# Patient Record
Sex: MALE | Race: WHITE | Employment: OTHER | URBAN - METROPOLITAN AREA
[De-identification: names, ages, dates, MRNs, and addresses within clinical notes are randomized per-mention and may not be internally consistent; named-entity substitution may affect disease eponyms.]

---

## 2017-02-02 ENCOUNTER — TELEPHONE (OUTPATIENT)
Dept: ENDOCRINOLOGY | Age: 62
End: 2017-02-02

## 2017-02-02 NOTE — TELEPHONE ENCOUNTER
Patient says Baljit Monday may require a prior authorization. Patient says when he went to  prescription pharmacist says insurance company needs our office to verify patient requires this prescription.

## 2017-02-07 NOTE — TELEPHONE ENCOUNTER
Patient would like an update on prior authorization. Patient says he is completely out of medication.

## 2017-02-07 NOTE — TELEPHONE ENCOUNTER
Informed pt that PA was received by insurance, but they requested allowing 5 business days to respond

## 2017-02-09 ENCOUNTER — TELEPHONE (OUTPATIENT)
Dept: ENDOCRINOLOGY | Age: 62
End: 2017-02-09

## 2017-03-02 ENCOUNTER — TELEPHONE (OUTPATIENT)
Dept: ENDOCRINOLOGY | Age: 62
End: 2017-03-02

## 2017-03-02 RX ORDER — SITAGLIPTIN AND METFORMIN HYDROCHLORIDE 50; 1000 MG/1; MG/1
TABLET, FILM COATED, EXTENDED RELEASE ORAL
Qty: 30 TAB | Refills: 6 | Status: SHIPPED | OUTPATIENT
Start: 2017-03-02 | End: 2017-03-30 | Stop reason: DRUGHIGH

## 2017-03-02 RX ORDER — PRAVASTATIN SODIUM 20 MG/1
TABLET ORAL
Qty: 90 TAB | Refills: 6 | Status: SHIPPED | OUTPATIENT
Start: 2017-03-02 | End: 2018-05-23 | Stop reason: SDUPTHER

## 2017-03-09 NOTE — TELEPHONE ENCOUNTER
Left message advising pt to contact his PCP about lyme disease testing.      Also he can stop by the office for new janumet savings card

## 2017-03-09 NOTE — TELEPHONE ENCOUNTER
----- Message from Maria Guadalupe Bangura sent at 3/8/2017  5:41 PM EST -----  Regarding: Dr Toussaint/telephone  Pt's (p) [phone], pt said though out the year he has had 4 imbed  tick bites the latest one found was last night, he  Brandon Cali he has an upcoming  Lab appt on 3/20/17 and would like to know if Dr Kip Khoury can add  A Lyme dz test to his order along with the regular diabetes check. .    Also his discount card for his Janumet medication has    He said the card is still good but he has used up all the discounts on the card and will need to have a new one issued to him, he would like to know if Dr Kip Khoury can issue a new card  So he can continue with the discounts for the medication

## 2017-03-13 ENCOUNTER — TELEPHONE (OUTPATIENT)
Dept: ENDOCRINOLOGY | Age: 62
End: 2017-03-13

## 2017-03-30 ENCOUNTER — OFFICE VISIT (OUTPATIENT)
Dept: ENDOCRINOLOGY | Age: 62
End: 2017-03-30

## 2017-03-30 VITALS
HEART RATE: 63 BPM | DIASTOLIC BLOOD PRESSURE: 58 MMHG | BODY MASS INDEX: 26.04 KG/M2 | SYSTOLIC BLOOD PRESSURE: 103 MMHG | HEIGHT: 71 IN | OXYGEN SATURATION: 99 % | TEMPERATURE: 96 F | WEIGHT: 186 LBS | RESPIRATION RATE: 20 BRPM

## 2017-03-30 DIAGNOSIS — I10 ESSENTIAL HYPERTENSION: ICD-10-CM

## 2017-03-30 DIAGNOSIS — E78.2 MIXED HYPERLIPIDEMIA: ICD-10-CM

## 2017-03-30 DIAGNOSIS — E11.65 UNCONTROLLED TYPE 2 DIABETES MELLITUS WITH HYPERGLYCEMIA, WITHOUT LONG-TERM CURRENT USE OF INSULIN (HCC): Primary | ICD-10-CM

## 2017-03-30 RX ORDER — LANCETS
EACH MISCELLANEOUS
Qty: 100 EACH | Refills: 11 | Status: SHIPPED | OUTPATIENT
Start: 2017-03-30

## 2017-03-30 NOTE — PROGRESS NOTES
Wt Readings from Last 3 Encounters:   03/30/17 186 lb (84.4 kg)   09/22/16 185 lb (83.9 kg)   03/22/16 185 lb (83.9 kg)     Temp Readings from Last 3 Encounters:   03/30/17 96 °F (35.6 °C) (Oral)   09/22/16 97.3 °F (36.3 °C) (Oral)   03/22/16 96.9 °F (36.1 °C) (Oral)     BP Readings from Last 3 Encounters:   03/30/17 103/58   09/22/16 107/63   03/22/16 119/72     Pulse Readings from Last 3 Encounters:   03/30/17 63   09/22/16 64   03/22/16 63     Lab Results   Component Value Date/Time    Hemoglobin A1c 6.0 03/20/2017 08:23 AM    Hemoglobin A1c (POC) 6.1 08/13/2013 09:17 AM     Last Podiatry ov 2016  Last Eye exam Nov 2016

## 2017-03-30 NOTE — MR AVS SNAPSHOT
Visit Information Date & Time Provider Department Dept. Phone Encounter #  
 3/30/2017  9:15 AM Gayathri Nuñez MD [facility] [phone] [id number] Follow-up Instructions Return in about 6 months (around 9/30/2017). Upcoming Health Maintenance Date Due Hepatitis C Screening 1955 FOOT EXAM Q1 11/29/1965 Pneumococcal 19-64 Medium Risk (1 of 1 - PPSV23) 11/29/1974 DTaP/Tdap/Td series (1 - Tdap) 11/29/1976 FOBT Q 1 YEAR AGE 50-75 11/29/2005 ZOSTER VACCINE AGE 60> 11/29/2015 INFLUENZA AGE 9 TO ADULT 8/1/2016 EYE EXAM RETINAL OR DILATED Q1 12/9/2016 LIPID PANEL Q1 9/15/2017 HEMOGLOBIN A1C Q6M 9/20/2017 MICROALBUMIN Q1 3/20/2018 Allergies as of 3/30/2017  Review Complete On: 3/30/2017 By: Gayathri Nuñez MD  
  
 Severity Noted Reaction Type Reactions Lisinopril  01/13/2011    Swelling Current Immunizations  Never Reviewed No immunizations on file. Not reviewed this visit You Were Diagnosed With   
  
 Codes Comments Uncontrolled type 2 diabetes mellitus with hyperglycemia, without long-term current use of insulin ([facility]    -  Primary ICD-10-CM: E11.65 ICD-9-CM: 250.02 Essential hypertension     ICD-10-CM: I10 
ICD-9-CM: 401.9 Mixed hyperlipidemia     ICD-10-CM: E78.2 ICD-9-CM: 272.2 Vitals BP Pulse Temp Resp Height(growth percentile) Weight(growth percentile) 103/58 63 96 °F (35.6 °C) (Oral) 20 5' 11\" (1.803 m) 186 lb (84.4 kg) SpO2 BMI Smoking Status 99% 25.94 kg/m2 Former Smoker BMI and BSA Data Body Mass Index Body Surface Area  
 25.94 kg/m 2 2.06 m 2 Preferred Pharmacy Pharmacy Name Phone 99 [facility], [address] [place] [address] [phone] Your Updated Medication List  
  
   
This list is accurate as of: 3/30/17  9:44 AM.  Always use your most recent med list.  
  
  
  
 ALPRAZolam 1 mg tablet Commonly known as:  Pixie Oz Take 1 mg by mouth nightly as needed. amLODIPine 10 mg tablet Commonly known as:  [place] Dre Take  by mouth daily. canagliflozin 300 mg tablet Commonly known as:  Darryl Echavarria TAKE 1 TABLET BY MOUTH EVERY DAY before BREAKFAST  
  
 EPIPEN 2-ARIS 0.3 mg/0.3 mL injection Generic drug:  EPINEPHrine  
  
 glucose blood VI test strips strip Commonly known as:  CONTOUR NEXT STRIPS Test 2 times daily. Dx code E11.65 Lancets Misc Commonly known as:  [place] Test 2 times daily. Dx code 250.00  
  
 losartan 50 mg tablet Commonly known as:  COZAAR Take  by mouth daily. pravastatin 20 mg tablet Commonly known as:  PRAVACHOL  
TAKE 1 TABLET BY MOUTH AT BEDTIME SITagliptin-metFORMIN 100-1,000 mg Tm24 Commonly known as:  JANUMET XR Once a day stop janumet 50/1000 dose Prescriptions Sent to Pharmacy Refills SITagliptin-metFORMIN (JANUMET XR) 100-1,000 mg TM24 6 Sig: Once a day stop janumet 50/1000 dose Class: Normal  
 Pharmacy: [address] Ph #: [phone]  
 canagliflozin (INVOKANA) 300 mg tablet 6 Sig: TAKE 1 TABLET BY MOUTH EVERY DAY before BREAKFAST Class: Normal  
 Pharmacy: [address] Ph #: [phone] Lancets (MICROLET LANCET) misc 11 Sig: Test 2 times daily. Dx code 250.00 Class: Normal  
 Pharmacy: [address] [facility] Ph #: [phone] Follow-up Instructions Return in about 6 months (around 9/30/2017). Patient Instructions   
 
 
 janumet xr  50/1000 mg to once a day with meal 
 
 
invokana 300 mg before b-fast ( drink plenty of water ) Introducing [facility] & HEALTH SERVICES! Roberto Victor introduces happin! patient portal. Now you can access parts of your medical record, email your doctor's office, and request medication refills online. 1. In your internet browser, go to [email or web address] 2. Click on the First Time User? Click Here link in the Sign In box. You will see the New Member Sign Up page. 3. Enter your happin! Access Code exactly as it appears below. You will not need to use this code after youve completed the sign-up process. If you do not sign up before the expiration date, you must request a new code. · happin! Access Code: [email or web address] Expires: 6/28/2017  9:44 AM 
 
4. Enter the last four digits of your Social Security Number (xxxx) and Date of Birth (mm/dd/yyyy) as indicated and click Submit. You will be taken to the next sign-up page. 5. Create a happin! ID. This will be your happin! login ID and cannot be changed, so think of one that is secure and easy to remember. 6. Create a happin! password. You can change your password at any time. 7. Enter your Password Reset Question and Answer. This can be used at a later time if you forget your password. 8. Enter your e-mail address. You will receive e-mail notification when new information is available in [address] 9. Click Sign Up. You can now view and download portions of your medical record. 10. Click the Download Summary menu link to download a portable copy of your medical information. If you have questions, please visit the Frequently Asked Questions section of the happin! website. Remember, happin! is NOT to be used for urgent needs. For medical emergencies, dial 911. Now available from your iPhone and Android! Please provide this summary of care documentation to your next provider. Your primary care clinician is listed as RUDY Gold. If you have any questions after today's visit, please call [phone].

## 2017-03-30 NOTE — PATIENT INSTRUCTIONS
janumet xr  50/1000 mg to once a day with meal      invokana 300 mg before b-fast ( drink plenty of water )

## 2017-03-30 NOTE — PROGRESS NOTES
HISTORY OF PRESENT ILLNESS   Vinny Tillman is a 64 y.o. male. HPI   F/u for DM 2 after last visit in Sept 2016   Gained 1 lb of weight     He had some issues with PA on invokana  And   Janumet xr   Compliant with meds, otherwise     C/o price on janumet xr   Happy man   Agrees to better compliance         Review of Systems   Constitutional: Negative. HENT: Negative. Eyes: Negative for pain and redness. Respiratory: Negative. Cardiovascular: Negative for chest pain, palpitations and leg swelling. Gastrointestinal: Negative. Negative for constipation. Genitourinary: Negative. Musculoskeletal: Negative for myalgias. Skin: Negative. Neurological: Negative. Endo/Heme/Allergies: Negative. Psychiatric/Behavioral: Negative for depression and memory loss. The patient does not have insomnia. Physical Exam   Constitutional: He is oriented to person, place, and time. He appears well-developed and well-nourished. HENT:   Head: Normocephalic. Eyes: Conjunctivae and extraocular motions are normal. Pupils are equal, round, and reactive to light. Neck: Normal range of motion. Neck supple. No JVD present. No tracheal deviation present. No thyromegaly present. Cardiovascular: Normal rate, regular rhythm and normal heart sounds. Pulmonary/Chest: Effort normal and breath sounds normal.   Abdominal: Soft. Bowel sounds are normal.   Musculoskeletal: Normal range of motion. Lymphadenopathy:   He has no cervical adenopathy. Neurological: He is alert and oriented to person, place, and time. He has normal reflexes. Skin: Skin is warm. Psychiatric: He has a normal mood and affect.      Diabetic feet exam  Dr. Salguero Mask           Lab Results   Component Value Date/Time    Hemoglobin A1c 6.0 03/20/2017 08:23 AM    Hemoglobin A1c 5.9 09/15/2016 08:57 AM    Hemoglobin A1c 5.8 03/17/2016 09:14 AM    Microalb/Creat ratio (ug/mg creat.) 27.6 03/20/2017 08:23 AM    LDL, calculated 92 09/15/2016 08:57 AM Creatinine 0.91 09/15/2016 08:57 AM      Lab Results   Component Value Date/Time    Cholesterol, total 161 09/15/2016 08:57 AM    HDL Cholesterol 52 09/15/2016 08:57 AM    LDL, calculated 92 09/15/2016 08:57 AM    Triglyceride 87 09/15/2016 08:57 AM     Lab Results   Component Value Date/Time    AST (SGOT) 13 03/20/2017 08:23 AM    Alk. phosphatase 51 09/15/2016 08:57 AM     Lab Results   Component Value Date/Time    GFR est  09/15/2016 08:57 AM    GFR est non-AA 91 09/15/2016 08:57 AM    Creatinine 0.91 09/15/2016 08:57 AM    BUN 15 09/15/2016 08:57 AM    Sodium 141 09/15/2016 08:57 AM    Potassium 4.2 09/15/2016 08:57 AM    Chloride 101 09/15/2016 08:57 AM    CO2 25 09/15/2016 08:57 AM            ASSESSMENT and PLAN     1. DM 2 uncontrolled- A1c is   6 %      From march 2017   compared to 5.9 %   From sept 2016  Compared  to    5.8 %    From march 2016  Compared to    5.6 %     From sept 2015 compared to  5.7 %   From March 2015 compared to  5.8 %   From sept 2014 compared to  6.4 % from feb 2014 compared to 6.1 % from aug 2013 compared to  6.6 %  from feb 2013 compared to  6.5 % feb 2013  Compared to 7.3 % from aug 2012; A1c was 6.7 % in sept 2011 and it was 7.9 % from march 2012    He has Not been experiencing low blood sugars and he eats consistently   He  changed his  behaviour in good way   Maintained glycemic control     Reviewed log from book - no lows   and blood sugars are good   TLC emphasized     Continue on janumet xr 50/1 gm bid ,  From kombiglyza to 2.5/1000 bid by formulary   He is taking it only once a day, he would like to try that way     continue  Invokana 300 mg a day   Reassured him that he will never have low sugars on current regimen        2. HPL : lipids at goal-TG is better,continue on pravachol     3. HTN: well controlled, on cozaar and norvasc , no microalb noted     4. Neuropathy - he stopped   gabapentin  Off vitacort     5.  Elevated liver enzymes- resolved   ASA 81 mg daily F/u in 6 months

## 2017-09-21 ENCOUNTER — OFFICE VISIT (OUTPATIENT)
Dept: ENDOCRINOLOGY | Age: 62
End: 2017-09-21

## 2017-09-21 VITALS
RESPIRATION RATE: 14 BRPM | HEART RATE: 66 BPM | BODY MASS INDEX: 25.76 KG/M2 | HEIGHT: 71 IN | WEIGHT: 184 LBS | DIASTOLIC BLOOD PRESSURE: 54 MMHG | TEMPERATURE: 98.2 F | SYSTOLIC BLOOD PRESSURE: 100 MMHG

## 2017-09-21 DIAGNOSIS — E78.2 MIXED HYPERLIPIDEMIA: ICD-10-CM

## 2017-09-21 DIAGNOSIS — E11.65 UNCONTROLLED TYPE 2 DIABETES MELLITUS WITH HYPERGLYCEMIA, WITHOUT LONG-TERM CURRENT USE OF INSULIN (HCC): Primary | ICD-10-CM

## 2017-09-21 DIAGNOSIS — I10 ESSENTIAL HYPERTENSION: ICD-10-CM

## 2017-09-21 NOTE — MR AVS SNAPSHOT
Visit Information Date & Time Provider Department Dept. Phone Encounter #  
 9/21/2017 11:30 AM Areli Thompson MD [facility] [phone] [id number] Follow-up Instructions Return in about 1 year (around 9/21/2018). Upcoming Health Maintenance Date Due Hepatitis C Screening 1955 FOOT EXAM Q1 11/29/1965 Pneumococcal 19-64 Medium Risk (1 of 1 - PPSV23) 11/29/1974 DTaP/Tdap/Td series (1 - Tdap) 11/29/1976 FOBT Q 1 YEAR AGE 50-75 11/29/2005 ZOSTER VACCINE AGE 60> 9/29/2015 EYE EXAM RETINAL OR DILATED Q1 12/9/2016 INFLUENZA AGE 9 TO ADULT 8/1/2017 HEMOGLOBIN A1C Q6M 3/14/2018 MICROALBUMIN Q1 9/14/2018 LIPID PANEL Q1 9/14/2018 Allergies as of 9/21/2017  Review Complete On: 9/21/2017 By: Areli Thompson MD  
  
 Severity Noted Reaction Type Reactions Lisinopril  01/13/2011    Swelling Current Immunizations  Never Reviewed No immunizations on file. Not reviewed this visit You Were Diagnosed With   
  
 Codes Comments Uncontrolled type 2 diabetes mellitus with hyperglycemia, without long-term current use of insulin ([facility] 75.)    -  Primary ICD-10-CM: E11.65 ICD-9-CM: 250.02 Mixed hyperlipidemia     ICD-10-CM: E78.2 ICD-9-CM: 272.2 Essential hypertension     ICD-10-CM: I10 
ICD-9-CM: 401.9 Vitals BP Pulse Temp Resp Height(growth percentile) Weight(growth percentile) 100/54 (BP 1 Location: Left arm, BP Patient Position: Sitting) 66 98.2 °F (36.8 °C) (Oral) 14 5' 11\" (1.803 m) 184 lb (83.5 kg) BMI Smoking Status 25.66 kg/m2 Former Smoker BMI and BSA Data Body Mass Index Body Surface Area  
 25.66 kg/m 2 2.05 m 2 Preferred Pharmacy Pharmacy Name Phone 99 [facility], [address] [place] [address] [phone] Your Updated Medication List  
  
   
 This list is accurate as of: 9/21/17 12:02 PM.  Always use your most recent med list.  
  
  
  
  
 ALPRAZolam 1 mg tablet Commonly known as:  Johnice Salts Take 1 mg by mouth nightly as needed. amLODIPine 10 mg tablet Commonly known as:  [place] R[place] Take  by mouth daily. empagliflozin 25 mg tablet Commonly known as:  Myrna Eh Take 1 Tab by mouth daily. Stop invokana EPIPEN 2-ARIS 0.3 mg/0.3 mL injection Generic drug:  EPINEPHrine  
  
 glucose blood VI test strips strip Commonly known as:  CONTOUR NEXT STRIPS Test 2 times daily. Dx code E11.65 Lancets Misc Commonly known as:  Elvia Needs Test 2 times daily. Dx code 250.00  
  
 losartan 50 mg tablet Commonly known as:  COZAAR Take  by mouth daily. pravastatin 20 mg tablet Commonly known as:  PRAVACHOL  
TAKE 1 TABLET BY MOUTH AT BEDTIME SITagliptin-metFORMIN 100-1,000 mg Tm24 Commonly known as:  JANUMET XR Once a day stop janumet 50/1000 dose Prescriptions Sent to Pharmacy Refills  
 empagliflozin (JARDIANCE) 25 mg tablet 12 Sig: Take 1 Tab by mouth daily. Stop invokana Class: Normal  
 Pharmacy: [facility] [address] [place] [address] AT [facility] #: [phone] Route: Oral  
  
Follow-up Instructions Return in about 1 year (around 9/21/2018). Patient Instructions   
 
 
 janumet xr  100/1000 mg to once a day with meal 
 
 
Stop invokana Start on jardiance 25 mg one pill  before b-fast ( drink plenty of water ) Start on B-complex Introducing [facility] & HEALTH SERVICES! [facility] introduces [employer] patient portal. Now you can access parts of your medical record, email your doctor's office, and request medication refills online. 1. In your internet browser, go to [email or web address]. [employer] 2. Click on the First Time User? Click Here link in the Sign In box.  You will see the New Member Sign Up page. 3. Enter your [employer] Access Code exactly as it appears below. You will not need to use this code after youve completed the sign-up process. If you do not sign up before the expiration date, you must request a new code. · [employer] Access Code: [email or web address] Expires: 12/20/2017 12:02 PM 
 
4. Enter the last four digits of your Social Security Number (xxxx) and Date of Birth (mm/dd/yyyy) as indicated and click Submit. You will be taken to the next sign-up page. 5. Create a [employer] ID. This will be your [employer] login ID and cannot be changed, so think of one that is secure and easy to remember. 6. Create a [employer] password. You can change your password at any time. 7. Enter your Password Reset Question and Answer. This can be used at a later time if you forget your password. 8. Enter your e-mail address. You will receive e-mail notification when new information is available in [address] 9. Click Sign Up. You can now view and download portions of your medical record. 10. Click the Download Summary menu link to download a portable copy of your medical information. If you have questions, please visit the Frequently Asked Questions section of the [employer] website. Remember, [employer] is NOT to be used for urgent needs. For medical emergencies, dial 911. Now available from your iPhone and Android! Please provide this summary of care documentation to your next provider. Your primary care clinician is listed as Greyson Landis. If you have any questions after today's visit, please call [phone].

## 2017-09-21 NOTE — PATIENT INSTRUCTIONS
janumet xr  100/1000 mg to once a day with meal      Stop invokana   Start on jardiance 25 mg one pill  before b-fast ( drink plenty of water )      Start on B-complex

## 2017-09-21 NOTE — PROGRESS NOTES
HISTORY OF PRESENT ILLNESS   Srinivasan Townsend is a 64 y.o. male. HPI   F/u for DM 2 after last visit in MArch 2017     He got diagnosed with [facility] fever   He got antibiotics by pcp       Gained 1 lb of weight   He had some issues with PA on invokana  And   Janumet xr   Compliant with meds, otherwise     C/o price on janumet xr   Happy man   Agrees to better compliance         Review of Systems   Constitutional: Negative. HENT: Negative. Eyes: Negative for pain and redness. Respiratory: Negative. Cardiovascular: Negative for chest pain, palpitations and leg swelling. Gastrointestinal: Negative. Negative for constipation. Genitourinary: Negative. Musculoskeletal: Negative for myalgias. Skin: Negative. Neurological: Negative. Endo/Heme/Allergies: Negative. Psychiatric/Behavioral: Negative for depression and memory loss. The patient does not have insomnia. Physical Exam   Constitutional: He is oriented to person, place, and time. He appears well-developed and well-nourished. HENT:   Head: Normocephalic. Eyes: Conjunctivae and extraocular motions are normal. Pupils are equal, round, and reactive to light. Neck: Normal range of motion. Neck supple. No JVD present. No tracheal deviation present. No thyromegaly present. Cardiovascular: Normal rate, regular rhythm and normal heart sounds. Pulmonary/Chest: Effort normal and breath sounds normal.   Abdominal: Soft. Bowel sounds are normal.   Musculoskeletal: Normal range of motion. Lymphadenopathy:   He has no cervical adenopathy. Neurological: He is alert and oriented to person, place, and time. He has normal reflexes. Skin: Skin is warm. Psychiatric: He has a normal mood and affect.      Diabetic feet exam  Dr. Delia Barrett           Lab Results   Component Value Date/Time    Hemoglobin A1c 5.6 09/14/2017 08:32 AM    Hemoglobin A1c 6.0 03/20/2017 08:23 AM    Hemoglobin A1c 5.9 09/15/2016 08:57 AM    Microalb/Creat ratio (ug/mg creat.) 10.4 09/14/2017 08:32 AM    LDL, calculated 90 09/14/2017 08:32 AM    Creatinine 0.93 09/14/2017 08:32 AM      Lab Results   Component Value Date/Time    Cholesterol, total 166 09/14/2017 08:32 AM    HDL Cholesterol 51 09/14/2017 08:32 AM    LDL, calculated 90 09/14/2017 08:32 AM    Triglyceride 125 09/14/2017 08:32 AM     Lab Results   Component Value Date/Time    AST (SGOT) 16 09/14/2017 08:32 AM    Alk. phosphatase 48 09/14/2017 08:32 AM     Lab Results   Component Value Date/Time    GFR est  09/14/2017 08:32 AM    GFR est non-AA 88 09/14/2017 08:32 AM    Creatinine 0.93 09/14/2017 08:32 AM    BUN 16 09/14/2017 08:32 AM    Sodium 140 09/14/2017 08:32 AM    Potassium 3.8 09/14/2017 08:32 AM    Chloride 100 09/14/2017 08:32 AM    CO2 20 09/14/2017 08:32 AM            ASSESSMENT and PLAN     1. DM 2 uncontrolled- A1c is  5.6 %   From sept 2017    Compared to   6 %      From march 2017   compared to 5.9 %   From sept 2016  Compared  to    5.8 %    From march 2016  Compared to    5.6 %     From sept 2015 compared to  5.7 %   From March 2015 compared to  5.8 %   From sept 2014 compared to  6.4 % from feb 2014 compared to 6.1 % from aug 2013 compared to  6.6 %  from feb 2013 compared to  6.5 % feb 2013  Compared to 7.3 % from aug 2012; A1c was 6.7 % in sept 2011 and it was 7.9 % from march 2012    He has Not been experiencing low blood sugars and he eats consistently   He  changed his  behaviour in good way   Maintained glycemic control     Reviewed log from book - no lows   and blood sugars are good   TLC emphasized     Continue on janumet xr 50/1 gm bid ,  From kombiglyza to 2.5/1000 bid by formulary   He is taking it only once a day, he would like to try that way     continue  Invokana 300 mg a day   Reassured him that he will never have low sugars on current regimen        2. HPL : lipids at goal-TG is better,continue on pravachol     3.  HTN: well controlled, on cozaar and norvasc , no microalb noted     4. Neuropathy - he stopped   Gabapentin  B-complex to be started   Off vitacort     5.  Elevated liver enzymes- resolved   ASA 81 mg daily             F/u in 12  months

## 2017-09-21 NOTE — PROGRESS NOTES
Anti-biotics finished 3 weeks ago    Wt Readings from Last 3 Encounters:   09/21/17 184 lb (83.5 kg)   03/30/17 186 lb (84.4 kg)   09/22/16 185 lb (83.9 kg)     Temp Readings from Last 3 Encounters:   09/21/17 98.2 °F (36.8 °C) (Oral)   03/30/17 96 °F (35.6 °C) (Oral)   09/22/16 97.3 °F (36.3 °C) (Oral)     BP Readings from Last 3 Encounters:   09/21/17 100/54   03/30/17 103/58   09/22/16 107/63     Pulse Readings from Last 3 Encounters:   09/21/17 66   03/30/17 63   09/22/16 64     Lab Results   Component Value Date/Time    Hemoglobin A1c 5.6 09/14/2017 08:32 AM    Hemoglobin A1c (POC) 6.1 08/13/2013 09:17 AM

## 2017-10-05 ENCOUNTER — TELEPHONE (OUTPATIENT)
Dept: ENDOCRINOLOGY | Age: 62
End: 2017-10-05

## 2017-10-09 ENCOUNTER — TELEPHONE (OUTPATIENT)
Dept: ENDOCRINOLOGY | Age: 62
End: 2017-10-09

## 2017-10-09 NOTE — TELEPHONE ENCOUNTER
----- Message from Johnna Fraser sent at 10/7/2017 11:18 AM EDT -----  Regarding: Dr. Perez Standing / Telephone  Pt is out of the \"jardivance Medication but needs a prior authorization for this medictaion to get a refill and best contact number is [phone].

## 2017-10-09 NOTE — TELEPHONE ENCOUNTER
----- Message from Johnna Fraser sent at 10/7/2017 11:18 AM EDT -----  Regarding: Dr. Lilia Brown / Telephone  Pt is out of the \"jardivance Medication but needs a prior authorization for this medictaion to get a refill and best contact number is [phone].

## 2017-10-10 NOTE — TELEPHONE ENCOUNTER
Spoke with patient and he stated he was able to get medication. No further questions noted at this time.

## 2018-09-26 ENCOUNTER — OFFICE VISIT (OUTPATIENT)
Dept: ENDOCRINOLOGY | Age: 63
End: 2018-09-26

## 2018-09-26 VITALS
OXYGEN SATURATION: 98 % | HEART RATE: 61 BPM | HEIGHT: 71 IN | TEMPERATURE: 97.6 F | BODY MASS INDEX: 26.07 KG/M2 | SYSTOLIC BLOOD PRESSURE: 118 MMHG | WEIGHT: 186.2 LBS | DIASTOLIC BLOOD PRESSURE: 63 MMHG | RESPIRATION RATE: 18 BRPM

## 2018-09-26 DIAGNOSIS — E78.2 MIXED HYPERLIPIDEMIA: ICD-10-CM

## 2018-09-26 DIAGNOSIS — E11.65 UNCONTROLLED TYPE 2 DIABETES MELLITUS WITH HYPERGLYCEMIA, WITHOUT LONG-TERM CURRENT USE OF INSULIN (HCC): Primary | ICD-10-CM

## 2018-09-26 DIAGNOSIS — I10 ESSENTIAL HYPERTENSION: ICD-10-CM

## 2018-09-26 DIAGNOSIS — E11.65 UNCONTROLLED TYPE 2 DIABETES MELLITUS WITH HYPERGLYCEMIA, WITHOUT LONG-TERM CURRENT USE OF INSULIN (HCC): ICD-10-CM

## 2018-09-26 NOTE — PATIENT INSTRUCTIONS
-------------------------------------------------------------------------------------------- Refills    -    please call your pharmacy and have them send us a refill request 
 
Results  -  allow up to a week for lab results to be processed and reviewed. Phone calls  -  Allow upto 24 hrs. for non-urgent calls to be retained Prior authorization - It may take up to 2 weeks to process, depending on your insurance Forms  -  FMLA, DMV, patient assistance, etc. will take up to 2 weeks to process Cancellations - please notify the office in advance if you cannot keep your appointment Samples  - will only be dispensed at visits as supply is limited If you are having a medical emergency call 911 
 
-------------------------------------------------------------------------------------------- 
 
 
 janumet xr  100/1000 mg to once a day with meal 
 
 
 jardiance 25 mg one pill  before b-fast ( drink plenty of water )

## 2018-09-26 NOTE — MR AVS SNAPSHOT
[address] [facility] [address] 
[phone] Patient: Devin Garcia MRN: [id number] :1955 Visit Information Date & Time Provider Department Dept. Phone Encounter #  
 2018 10:00 AM Hazel Khalil MD [facility] [phone] [id number] Follow-up Instructions Return in about 1 year (around 2019). Upcoming Health Maintenance Date Due Hepatitis C Screening 1955 FOOT EXAM Q1 1965 Pneumococcal 19-64 Medium Risk (1 of 1 - PPSV23) 1974 DTaP/Tdap/Td series (1 - Tdap) 1976 Shingrix Vaccine Age 50> (1 of 2) 2005 FOBT Q 1 YEAR AGE 50-75 2005 EYE EXAM RETINAL OR DILATED Q1 2016 Influenza Age 5 to Adult 2018 HEMOGLOBIN A1C Q6M 3/19/2019 MICROALBUMIN Q1 2019 LIPID PANEL Q1 2019 Allergies as of 2018  Review Complete On: 2018 By: Hazel Khalil MD  
  
 Severity Noted Reaction Type Reactions Lisinopril  2011    Swelling Current Immunizations  Never Reviewed No immunizations on file. Not reviewed this visit You Were Diagnosed With   
  
 Codes Comments Uncontrolled type 2 diabetes mellitus with hyperglycemia, without long-term current use of insulin ([facility] [address]    -  Primary ICD-10-CM: E11.65 ICD-9-CM: 250.02 Essential hypertension     ICD-10-CM: I10 
ICD-9-CM: 401.9 Mixed hyperlipidemia     ICD-10-CM: E78.2 ICD-9-CM: 272.2 Vitals BP Pulse Temp Resp Height(growth percentile) Weight(growth percentile)  
 118/63 (BP 1 Location: Left arm, BP Patient Position: Sitting) 61 97.6 °F (36.4 °C) (Oral) 18 5' 11\" (1.803 m) 186 lb 3.2 oz (84.5 kg) SpO2 BMI Smoking Status 98% 25.97 kg/m2 Former Smoker Vitals History BMI and BSA Data Body Mass Index Body Surface Area  
 25.97 kg/m 2 2.06 m 2 Preferred Pharmacy Pharmacy Name Phone [facility], [address] [place] [address] Yas Jean [phone] Your Updated Medication List  
  
   
This list is accurate as of 9/26/18 10:31 AM.  Always use your most recent med list.  
  
  
  
  
 ALPRAZolam 1 mg tablet Commonly known as:  Lety Saadia Take 1 mg by mouth nightly as needed. amLODIPine 10 mg tablet Commonly known as:  Roselia Isabella Take  by mouth daily. B COMPLEX PO Take  by mouth.  
  
 empagliflozin 25 mg tablet Commonly known as:  [place] Octave Take 1 Tab by mouth daily. Stop invokana EPIPEN 2-ARIS 0.3 mg/0.3 mL injection Generic drug:  EPINEPHrine  
  
 glucose blood VI test strips strip Commonly known as:  CONTOUR NEXT TEST STRIPS Test 2 times daily. Dx code E11.65 JANUMET -1,000 mg Tm24 Generic drug:  SITagliptin-metFORMIN  
TAKE 1 TABLET BY MOUTH EVERY DAY Lancets Misc Commonly known as:  Reyna Miser Test 2 times daily. Dx code 250.00  
  
 losartan 50 mg tablet Commonly known as:  COZAAR Take  by mouth daily. pravastatin 20 mg tablet Commonly known as:  PRAVACHOL  
TAKE 1 TABLET BY MOUTH AT BEDTIME Follow-up Instructions Return in about 1 year (around 9/26/2019). Patient Instructions   
-------------------------------------------------------------------------------------------- Refills    -    please call your pharmacy and have them send us a refill request 
 
Results  -  allow up to a week for lab results to be processed and reviewed. Phone calls  -  Allow upto 24 hrs. for non-urgent calls to be retained Prior authorization - It may take up to 2 weeks to process, depending on your insurance Forms  -  FMLA, DMV, patient assistance, etc. will take up to 2 weeks to process Cancellations - please notify the office in advance if you cannot keep your appointment Samples  - will only be dispensed at visits as supply is limited If you are having a medical emergency call 911 
 
-------------------------------------------------------------------------------------------- 
 
 
 janumet xr  100/1000 mg to once a day with meal 
 
 
 jardiance 25 mg one pill  before b-fast ( drink plenty of water ) Start on B-complex Introducing [facility]! [facility] introduces Startpack patient portal. Now you can access parts of your medical record, email your doctor's office, and request medication refills online. 1. In your internet browser, go to [email or web address] 2. Click on the First Time User? Click Here link in the Sign In box. You will see the New Member Sign Up page. 3. Enter your Startpack Access Code exactly as it appears below. You will not need to use this code after youve completed the sign-up process. If you do not sign up before the expiration date, you must request a new code. · Startpack Access Code: [email or web address] Expires: 12/25/2018 10:31 AM 
 
4. Enter the last four digits of your Social Security Number (xxxx) and Date of Birth (mm/dd/yyyy) as indicated and click Submit. You will be taken to the next sign-up page. 5. Create a PharmAssistantt ID. This will be your Startpack login ID and cannot be changed, so think of one that is secure and easy to remember. 6. Create a Startpack password. You can change your password at any time. 7. Enter your Password Reset Question and Answer. This can be used at a later time if you forget your password. 8. Enter your e-mail address. You will receive e-mail notification when new information is available in [address]. 9. Click Sign Up. You can now view and download portions of your medical record. 10. Click the Download Summary menu link to download a portable copy of your medical information.  
 
If you have questions, please visit the Frequently Asked Questions section of the [employer]. Remember, [employer] is NOT to be used for urgent needs. For medical emergencies, dial 911. Now available from your iPhone and Android! Please provide this summary of care documentation to your next provider. Your primary care clinician is listed as Francisco Raya. If you have any questions after today's visit, please call [phone].

## 2018-09-26 NOTE — PROGRESS NOTES
1. Have you been to the ER, urgent care clinic since your last visit? No Hospitalized since your last visit? No 
 
2. Have you seen or consulted any other health care providers outside of the [facility] since your last visit? No 
 
 
Wt Readings from Last 3 Encounters:  
09/26/18 186 lb 3.2 oz (84.5 kg) 09/21/17 184 lb (83.5 kg) 03/30/17 186 lb (84.4 kg) Temp Readings from Last 3 Encounters:  
09/26/18 97.6 °F (36.4 °C) (Oral) 09/21/17 98.2 °F (36.8 °C) (Oral) 03/30/17 96 °F (35.6 °C) (Oral) BP Readings from Last 3 Encounters:  
09/26/18 118/63  
09/21/17 100/54  
03/30/17 103/58 Pulse Readings from Last 3 Encounters:  
09/26/18 61  
09/21/17 66  
03/30/17 63 Lab Results Component Value Date/Time Hemoglobin A1c 5.6 09/19/2018 08:13 AM  
 Hemoglobin A1c (POC) 6.1 08/13/2013 09:17 AM  
 
Patient will schedule eye exam. 
Patient has meter today.

## 2018-09-26 NOTE — PROGRESS NOTES
HISTORY OF PRESENT ILLNESS Concetta Guzman is a 58 y.o. male. HPI  
F/u for DM 2 after last visit in Sept 2017 He has been doing well C/o neuropathy symptoms He has migraines Old history He got diagnosed with [facility] fever He got antibiotics by pcp Gained 1 lb of weight He had some issues with PA on invokana  And   Janumet xr Compliant with meds, otherwise C/o price on [employer] Happy man Agrees to better compliance Review of Systems Constitutional: Negative. HENT: Negative. Eyes: Negative for pain and redness. Respiratory: Negative. Cardiovascular: Negative for chest pain, palpitations and leg swelling. Gastrointestinal: Negative. Negative for constipation. Genitourinary: Negative. Musculoskeletal: Negative for myalgias. Skin: Negative. Neurological: Negative. Endo/Heme/Allergies: Negative. Psychiatric/Behavioral: Negative for depression and memory loss. The patient does not have insomnia. Physical Exam  
Constitutional: He is oriented to person, place, and time. He appears well-developed and well-nourished. HENT:  
Head: Normocephalic. Eyes: Conjunctivae and extraocular motions are normal. Pupils are equal, round, and reactive to light. Neck: Normal range of motion. Neck supple. No JVD present. No tracheal deviation present. No thyromegaly present. Cardiovascular: Normal rate, regular rhythm and normal heart sounds. Pulmonary/Chest: Effort normal and breath sounds normal.  
Abdominal: Soft. Bowel sounds are normal.  
Musculoskeletal: Normal range of motion. Lymphadenopathy:  
He has no cervical adenopathy. Neurological: He is alert and oriented to person, place, and time. He has normal reflexes. Skin: Skin is warm. Psychiatric: He has a normal mood and affect. Diabetic foot exam: sept 2018 Left Foot: 
 Visual Exam: normal  
 Pulse DP: 2+ (normal) Filament test: normal sensation Vibratory sensation: normal 
   
Right Foot: 
 Visual Exam: normal  
 Pulse DP: 2+ (normal) Filament test: normal sensation Vibratory sensation: normal  
 
 
 
 
 
 
Lab Results Component Value Date/Time Hemoglobin A1c 5.6 09/19/2018 08:13 AM  
 Hemoglobin A1c 5.6 09/14/2017 08:32 AM  
 Hemoglobin A1c 6.0 (H) 03/20/2017 08:23 AM  
 Microalb/Creat ratio (ug/mg creat.) 10.2 09/19/2018 08:13 AM  
 LDL, calculated 77 09/19/2018 08:13 AM  
 Creatinine 0.95 09/19/2018 08:13 AM  
  
Lab Results Component Value Date/Time Cholesterol, total 147 09/19/2018 08:13 AM  
 HDL Cholesterol 47 09/19/2018 08:13 AM  
 LDL, calculated 77 09/19/2018 08:13 AM  
 Triglyceride 113 09/19/2018 08:13 AM  
 
Lab Results Component Value Date/Time AST (SGOT) 17 09/19/2018 08:13 AM  
 Alk. phosphatase 46 09/19/2018 08:13 AM  
 
Lab Results Component Value Date/Time GFR est AA 99 09/19/2018 08:13 AM  
 GFR est non-AA 85 09/19/2018 08:13 AM  
 Creatinine 0.95 09/19/2018 08:13 AM  
 BUN 14 09/19/2018 08:13 AM  
 Sodium 141 09/19/2018 08:13 AM  
 Potassium 4.1 09/19/2018 08:13 AM  
 Chloride 104 09/19/2018 08:13 AM  
 CO2 23 09/19/2018 08:13 AM  
  
 
 
 
ASSESSMENT and PLAN 1. DM 2 uncontrolled- A1c is   5.6 %     From   Sept 2018     Compared to  5.6 %   From sept 2017    Compared to   6 %      From march 2017   compared to 5.9 %   From sept 2016  Compared  to    5.8 %    From march 2016  Compared to    5.6 %     From sept 2015 compared to  5.7 %   From March 2015 compared to  5.8 %   From sept 2014 compared to  6.4 % from feb 2014 compared to 6.1 % from aug 2013 compared to  6.6 %  from feb 2013 compared to  6.5 % feb 2013  Compared to 7.3 % from aug 2012; A1c was 6.7 % in sept 2011 and it was 7.9 % from march 2012 He has Not been experiencing low blood sugars and he eats consistently He  changed his  behaviour in good way Maintaining glycemic control Reviewed log from book - no lows and blood sugars are good TLC emphasized Continue on janumet xr 50/1 gm bid ,   Stopped  kombiglyze  to 2.5/1000 bid by formulary He is taking it only once a day, he would like to try that way Changed from   Invokana 300 mg a day  To jardiance 25 by formulary Reassured him that he will never have low sugars on current regimen 2. HPL : lipids at goal-TG is better,continue on pravachol 3. HTN: well controlled, on cozaar and norvasc , no microalb noted 4. Neuropathy - he stopped   Gabapentin B-complex to be started Off vitacort 5. Elevated liver enzymes- resolved ASA 81 mg daily F/u in 12  months

## 2018-11-12 ENCOUNTER — DOCUMENTATION ONLY (OUTPATIENT)
Dept: ENDOCRINOLOGY | Age: 63
End: 2018-11-12

## 2019-08-12 RX ORDER — PRAVASTATIN SODIUM 20 MG/1
TABLET ORAL
Qty: 90 TAB | Refills: 0 | Status: SHIPPED | OUTPATIENT
Start: 2019-08-12 | End: 2019-10-13 | Stop reason: SDUPTHER

## 2019-09-10 DIAGNOSIS — E78.2 MIXED HYPERLIPIDEMIA: ICD-10-CM

## 2019-09-10 DIAGNOSIS — E11.65 UNCONTROLLED TYPE 2 DIABETES MELLITUS WITH HYPERGLYCEMIA, WITHOUT LONG-TERM CURRENT USE OF INSULIN (HCC): ICD-10-CM

## 2019-09-10 DIAGNOSIS — I10 ESSENTIAL HYPERTENSION: ICD-10-CM

## 2019-09-11 LAB
ALBUMIN SERPL-MCNC: 4.9 G/DL (ref 3.6–4.8)
ALBUMIN/CREAT UR: 9.5 MG/G CREAT (ref 0–30)
ALBUMIN/GLOB SERPL: 2.1 {RATIO} (ref 1.2–2.2)
ALP SERPL-CCNC: 54 IU/L (ref 39–117)
ALT SERPL-CCNC: 20 IU/L (ref 0–44)
AST SERPL-CCNC: 18 IU/L (ref 0–40)
BILIRUB SERPL-MCNC: 0.8 MG/DL (ref 0–1.2)
BUN SERPL-MCNC: 14 MG/DL (ref 8–27)
BUN/CREAT SERPL: 14 (ref 10–24)
CALCIUM SERPL-MCNC: 10 MG/DL (ref 8.6–10.2)
CHLORIDE SERPL-SCNC: 103 MMOL/L (ref 96–106)
CHOLEST SERPL-MCNC: 156 MG/DL (ref 100–199)
CO2 SERPL-SCNC: 23 MMOL/L (ref 20–29)
CREAT SERPL-MCNC: 1 MG/DL (ref 0.76–1.27)
CREAT UR-MCNC: 85.9 MG/DL
EST. AVERAGE GLUCOSE BLD GHB EST-MCNC: 123 MG/DL
GLOBULIN SER CALC-MCNC: 2.3 G/DL (ref 1.5–4.5)
GLUCOSE SERPL-MCNC: 123 MG/DL (ref 65–99)
HBA1C MFR BLD: 5.9 % (ref 4.8–5.6)
HDLC SERPL-MCNC: 46 MG/DL
INTERPRETATION, 910389: NORMAL
LDLC SERPL CALC-MCNC: 83 MG/DL (ref 0–99)
Lab: NORMAL
MICROALBUMIN UR-MCNC: 8.2 UG/ML
POTASSIUM SERPL-SCNC: 4.5 MMOL/L (ref 3.5–5.2)
PROT SERPL-MCNC: 7.2 G/DL (ref 6–8.5)
SODIUM SERPL-SCNC: 142 MMOL/L (ref 134–144)
TRIGL SERPL-MCNC: 133 MG/DL (ref 0–149)
VLDLC SERPL CALC-MCNC: 27 MG/DL (ref 5–40)

## 2019-09-17 ENCOUNTER — OFFICE VISIT (OUTPATIENT)
Dept: ENDOCRINOLOGY | Age: 64
End: 2019-09-17

## 2019-09-17 VITALS
SYSTOLIC BLOOD PRESSURE: 101 MMHG | TEMPERATURE: 97 F | RESPIRATION RATE: 16 BRPM | HEIGHT: 71 IN | BODY MASS INDEX: 26.38 KG/M2 | DIASTOLIC BLOOD PRESSURE: 60 MMHG | HEART RATE: 66 BPM | OXYGEN SATURATION: 96 % | WEIGHT: 188.4 LBS

## 2019-09-17 DIAGNOSIS — E78.2 MIXED HYPERLIPIDEMIA: ICD-10-CM

## 2019-09-17 DIAGNOSIS — E11.65 UNCONTROLLED TYPE 2 DIABETES MELLITUS WITH HYPERGLYCEMIA, WITHOUT LONG-TERM CURRENT USE OF INSULIN (HCC): Primary | ICD-10-CM

## 2019-09-17 DIAGNOSIS — E11.65 UNCONTROLLED TYPE 2 DIABETES MELLITUS WITH HYPERGLYCEMIA, WITHOUT LONG-TERM CURRENT USE OF INSULIN (HCC): ICD-10-CM

## 2019-09-17 DIAGNOSIS — I10 ESSENTIAL HYPERTENSION: ICD-10-CM

## 2019-09-17 NOTE — PROGRESS NOTES
HISTORY OF PRESENT ILLNESS   Shabnam Rubalcava is a 61  y.o. male. HPI   F/u for DM 2 after last visit in Sept 2018     He has been doing well   Stays active     Gained 2 lbs   He has pain in genital area worried about taking \"jardiance\"        Old history     He got diagnosed with [facility] fever   He got antibiotics by pcp     Gained 1 lb of weight   He had some issues with PA on invokana  And   Janumet xr   Compliant with meds, otherwise     C/o price on janumet xr   Happy man   Agrees to better compliance         Review of Systems   Constitutional: Negative. HENT: Negative. Eyes: Negative for pain and redness. Respiratory: Negative. Cardiovascular: Negative for chest pain, palpitations and leg swelling. Gastrointestinal: Negative. Negative for constipation. Genitourinary: Negative. Musculoskeletal: Negative for myalgias. Skin: Negative. Neurological: Negative. Endo/Heme/Allergies: Negative. Psychiatric/Behavioral: Negative for depression and memory loss. The patient does not have insomnia. Physical Exam   Constitutional: He is oriented to person, place, and time. He appears well-developed and well-nourished. HENT:   Head: Normocephalic. Eyes: Conjunctivae and extraocular motions are normal. Pupils are equal, round, and reactive to light. Neck: Normal range of motion. Neck supple. No JVD present. No tracheal deviation present. No thyromegaly present. Cardiovascular: Normal rate, regular rhythm and normal heart sounds. Pulmonary/Chest: Effort normal and breath sounds normal.   Abdominal: Soft. Bowel sounds are normal.   Musculoskeletal: Normal range of motion. Lymphadenopathy:   He has no cervical adenopathy. Neurological: He is alert and oriented to person, place, and time. He has normal reflexes. Skin: Skin is warm. Psychiatric: He has a normal mood and affect.        Diabetic foot exam: sept 2019    Left Foot:   Visual Exam: normal    Pulse DP: 2+ (normal)   Filament test: normal sensation    Vibratory sensation: normal      Right Foot:   Visual Exam: normal    Pulse DP: 2+ (normal)   Filament test: normal sensation    Vibratory sensation: normal               Lab Results   Component Value Date/Time    Hemoglobin A1c 5.9 (H) 09/10/2019 08:08 AM    Hemoglobin A1c 5.6 09/19/2018 08:13 AM    Hemoglobin A1c 5.6 09/14/2017 08:32 AM    Microalb/Creat ratio (ug/mg creat.) 9.5 09/10/2019 08:08 AM    LDL, calculated 83 09/10/2019 08:08 AM    Creatinine 1.00 09/10/2019 08:08 AM      Lab Results   Component Value Date/Time    Cholesterol, total 156 09/10/2019 08:08 AM    HDL Cholesterol 46 09/10/2019 08:08 AM    LDL, calculated 83 09/10/2019 08:08 AM    Triglyceride 133 09/10/2019 08:08 AM     Lab Results   Component Value Date/Time    AST (SGOT) 18 09/10/2019 08:08 AM    Alk.  phosphatase 54 09/10/2019 08:08 AM     Lab Results   Component Value Date/Time    GFR est AA 92 09/10/2019 08:08 AM    GFR est non-AA 80 09/10/2019 08:08 AM    Creatinine 1.00 09/10/2019 08:08 AM    BUN 14 09/10/2019 08:08 AM    Sodium 142 09/10/2019 08:08 AM    Potassium 4.5 09/10/2019 08:08 AM    Chloride 103 09/10/2019 08:08 AM    CO2 23 09/10/2019 08:08 AM            ASSESSMENT and PLAN     1. DM 2 uncontrolled- A1c is  5.9 %     From sept 2019    Compared  to   5.6 %     From   Sept 2018     Compared to  5.6 %   From sept 2017    Compared to   6 %      From march 2017   compared to 5.9 %   From sept 2016  Compared  to    5.8 %    From march 2016  Compared to    5.6 %     From sept 2015 compared to  5.7 %   From March 2015 compared to  5.8 %   From sept 2014 compared to  6.4 % from feb 2014     He has Not been experiencing low blood sugars and he eats consistently   He  changed his  behaviour in good way   Maintaining glycemic control     Reviewed log from book - no lows   and blood sugars are good   TLC emphasized     Continue on janumet xr 50/1 gm bid ,   Stopped  kombiglyze  to 2.5/1000 bid by formulary   He is taking it only once a day, he would like to try that way     Changed from   Invokana 300 mg a day  To jardiance 25 by formulary   Reassured him that he will never have low sugars on current regimen        2. HPL : lipids at goal-TG is better,continue on pravachol     3. HTN: well controlled, on cozaar and norvasc , no microalb noted     4. Neuropathy - he stopped   Gabapentin  B-complex to be continued   Off vitacort     5. Elevated liver enzymes- resolved   ASA 81 mg daily     6.  Exposure to Ticks  A lot           F/u in 12  months

## 2019-09-17 NOTE — LETTER
9/17/19 Patient: January Hanna YOB: 1955 Date of Visit: 9/17/2019 Werner Vasquez PA-C 
[address] [place] [address] [id number] [place] [address] Road [facility] VIA Facsimile: [phone] Dear Werner Vasquez PA-C, Thank you for referring Mr. Levon Swartz to [address] [place] [address] for evaluation. My notes for this consultation are attached. If you have questions, please do not hesitate to call me. I look forward to following your patient along with you. Sincerely, Matthew Rodríguez MD

## 2019-09-17 NOTE — PROGRESS NOTES
Lab Results   Component Value Date/Time    Hemoglobin A1c 5.9 (H) 09/10/2019 08:08 AM    Hemoglobin A1c 5.6 09/19/2018 08:13 AM    Hemoglobin A1c 5.6 09/14/2017 08:32 AM     Lab Results   Component Value Date/Time    Microalb/Creat ratio (ug/mg creat.) 9.5 09/10/2019 08:08 AM     Lab Results   Component Value Date/Time    Cholesterol, total 156 09/10/2019 08:08 AM    HDL Cholesterol 46 09/10/2019 08:08 AM    LDL, calculated 83 09/10/2019 08:08 AM    VLDL, calculated 27 09/10/2019 08:08 AM    Triglyceride 133 09/10/2019 08:08 AM     Diabetic Foot and Eye Exam HM Status   Topic Date Due    Diabetic Foot Care  09/26/2019    Eye Exam  01/16/2021     Wt Readings from Last 3 Encounters:   09/17/19 188 lb 6.4 oz (85.5 kg)   09/26/18 186 lb 3.2 oz (84.5 kg)   09/21/17 184 lb (83.5 kg)     Temp Readings from Last 3 Encounters:   09/17/19 97 °F (36.1 °C)   09/26/18 97.6 °F (36.4 °C) (Oral)   09/21/17 98.2 °F (36.8 °C) (Oral)     BP Readings from Last 3 Encounters:   09/17/19 101/60   09/26/18 118/63   09/21/17 100/54     Pulse Readings from Last 3 Encounters:   09/17/19 66   09/26/18 61   09/21/17 66

## 2019-09-17 NOTE — PATIENT INSTRUCTIONS
--------------------------------------------------------------------------------------------    Refills    -    please call your pharmacy and have them send us a refill request    Results  -  allow up to a week for lab results to be processed and reviewed. Phone calls  -  Allow upto 24 hrs.  for non-urgent calls to be retained    Prior authorization - It may take up to 2 weeks to process, depending on your insurance    Forms  -  FMLA, DMV, patient assistance, etc. will take up to 2 weeks to process    Cancellations - please notify the office in advance if you cannot keep your appointment    Samples  - will only be dispensed at visits as supply is limited      If you are having a medical emergency call 911    --------------------------------------------------------------------------------------------       janumet xr  100/1000 mg  once a day with meal       jardiance 25 mg one pill  before b-fast ( drink plenty of water )

## 2019-09-18 RX ORDER — INSULIN PUMP SYRINGE, 3 ML
EACH MISCELLANEOUS
Qty: 1 KIT | Refills: 0 | Status: SHIPPED | OUTPATIENT
Start: 2019-09-18

## 2019-09-18 RX ORDER — LANCETS 33 GAUGE
EACH MISCELLANEOUS
Qty: 200 LANCET | Refills: 4 | Status: SHIPPED | OUTPATIENT
Start: 2019-09-18

## 2020-01-27 ENCOUNTER — TELEPHONE (OUTPATIENT)
Dept: ENDOCRINOLOGY | Age: 65
End: 2020-01-27

## 2020-01-27 NOTE — TELEPHONE ENCOUNTER
----- Message from Dominic Aburto sent at 1/27/2020 12:31 PM EST -----  Regarding: Dr. Carola Abebe  Medication Refill    Caller (if not patient):      Relationship of caller (if not patient):      Best contact number(s): [phone]      Name of medication and dosage if known:  Jardiance Rx     Is patient out of this medication (yes/no): Y      Pharmacy name:[facility] [address] [facility] listed in chart? (yes/no): Y  Pharmacy phone number:      Details to clarify the request: Pt had some issues with the insurance company previously processing the Rx in a timely manner and he is currently dealing with this issue again. He is under the impression that he waiting for this to be approve for a more cost efficient  replacement or the Rx itself.   He is also asking can he just go forward with taking the Janumet due to his number being under control        [address]
Janumet savings card placed at the  for . Shabbir CURIEL initiated and Approved. Patient informed.
Patient called insurance. Insurance states [employer]  requiring prior Jocelyne Bibber. [phone] is phone number. Surjit Méndez went up. Can he come by for coupon?
Declined

## 2020-08-24 DIAGNOSIS — E11.65 UNCONTROLLED TYPE 2 DIABETES MELLITUS WITH HYPERGLYCEMIA, WITHOUT LONG-TERM CURRENT USE OF INSULIN (HCC): ICD-10-CM

## 2020-08-24 DIAGNOSIS — I10 ESSENTIAL HYPERTENSION: ICD-10-CM

## 2020-08-24 DIAGNOSIS — E78.2 MIXED HYPERLIPIDEMIA: ICD-10-CM

## 2020-08-24 RX ORDER — SITAGLIPTIN AND METFORMIN HYDROCHLORIDE 100; 1000 MG/1; MG/1
TABLET, FILM COATED, EXTENDED RELEASE ORAL
Qty: 30 TAB | Refills: 6 | Status: SHIPPED | OUTPATIENT
Start: 2020-08-24 | End: 2020-09-14 | Stop reason: SDUPTHER

## 2020-09-08 LAB
COMMENT, HOLDF: NORMAL
SAMPLES BEING HELD,HOLD: NORMAL

## 2020-09-14 ENCOUNTER — OFFICE VISIT (OUTPATIENT)
Dept: ENDOCRINOLOGY | Age: 65
End: 2020-09-14
Payer: COMMERCIAL

## 2020-09-14 VITALS
OXYGEN SATURATION: 98 % | HEART RATE: 64 BPM | WEIGHT: 188.6 LBS | TEMPERATURE: 97.9 F | HEIGHT: 71 IN | BODY MASS INDEX: 26.4 KG/M2 | DIASTOLIC BLOOD PRESSURE: 66 MMHG | RESPIRATION RATE: 18 BRPM | SYSTOLIC BLOOD PRESSURE: 112 MMHG

## 2020-09-14 DIAGNOSIS — I10 ESSENTIAL HYPERTENSION: ICD-10-CM

## 2020-09-14 DIAGNOSIS — E78.2 MIXED HYPERLIPIDEMIA: ICD-10-CM

## 2020-09-14 DIAGNOSIS — E11.65 UNCONTROLLED TYPE 2 DIABETES MELLITUS WITH HYPERGLYCEMIA, WITHOUT LONG-TERM CURRENT USE OF INSULIN (HCC): ICD-10-CM

## 2020-09-14 DIAGNOSIS — E11.65 UNCONTROLLED TYPE 2 DIABETES MELLITUS WITH HYPERGLYCEMIA, WITHOUT LONG-TERM CURRENT USE OF INSULIN (HCC): Primary | ICD-10-CM

## 2020-09-14 PROCEDURE — 99214 OFFICE O/P EST MOD 30 MIN: CPT | Performed by: INTERNAL MEDICINE

## 2020-09-14 RX ORDER — SITAGLIPTIN AND METFORMIN HYDROCHLORIDE 100; 1000 MG/1; MG/1
TABLET, FILM COATED, EXTENDED RELEASE ORAL
Qty: 90 TAB | Refills: 3 | Status: SHIPPED | OUTPATIENT
Start: 2020-09-14 | End: 2021-03-20

## 2020-09-14 RX ORDER — PRAVASTATIN SODIUM 20 MG/1
TABLET ORAL
Qty: 90 TAB | Refills: 3 | Status: SHIPPED | OUTPATIENT
Start: 2020-09-14 | End: 2021-01-02

## 2020-09-14 NOTE — LETTER
9/14/20 Patient: Lisa Sadler YOB: 1955 Date of Visit: 9/14/2020 Maria Luisa Maloney PA-C 
[address] [place] [address] Drive [address] [place] [address] VIA Facsimile: [phone] Dear Maria Luisa Maloney PA-C, Thank you for referring Mr. Martin Mccarty to [address] [place] [address] for evaluation. My notes for this consultation are attached. If you have questions, please do not hesitate to call me. I look forward to following your patient along with you. Sincerely, Kameron Estes MD

## 2020-09-14 NOTE — PROGRESS NOTES
1. Have you been to the ER, urgent care clinic since your last visit? No Hospitalized since your last visit? No    2. Have you seen or consulted any other health care providers outside of the [address] [place] [address] since your last visit? Include any pap smears or colon screening. No    Wt Readings from Last 3 Encounters:   09/14/20 188 lb 9.6 oz (85.5 kg)   09/17/19 188 lb 6.4 oz (85.5 kg)   09/26/18 186 lb 3.2 oz (84.5 kg)     Temp Readings from Last 3 Encounters:   09/14/20 97.9 °F (36.6 °C) (Oral)   09/17/19 97 °F (36.1 °C)   09/26/18 97.6 °F (36.4 °C) (Oral)     BP Readings from Last 3 Encounters:   09/14/20 112/66   09/17/19 101/60   09/26/18 118/63     Pulse Readings from Last 3 Encounters:   09/14/20 64   09/17/19 66   09/26/18 61     Lab Results   Component Value Date/Time    Hemoglobin A1c 5.7 (H) 09/01/2020 08:19 AM    Hemoglobin A1c (POC) 6.1 08/13/2013 09:17 AM     Patient has meter today.

## 2021-01-02 RX ORDER — PRAVASTATIN SODIUM 20 MG/1
TABLET ORAL
Qty: 90 TAB | Refills: 3 | Status: SHIPPED | OUTPATIENT
Start: 2021-01-02 | End: 2021-09-23 | Stop reason: SDUPTHER

## 2021-03-20 DIAGNOSIS — I10 ESSENTIAL HYPERTENSION: ICD-10-CM

## 2021-03-20 DIAGNOSIS — E11.65 UNCONTROLLED TYPE 2 DIABETES MELLITUS WITH HYPERGLYCEMIA, WITHOUT LONG-TERM CURRENT USE OF INSULIN (HCC): ICD-10-CM

## 2021-03-20 DIAGNOSIS — E78.2 MIXED HYPERLIPIDEMIA: ICD-10-CM

## 2021-03-20 RX ORDER — SITAGLIPTIN AND METFORMIN HYDROCHLORIDE 100; 1000 MG/1; MG/1
TABLET, FILM COATED, EXTENDED RELEASE ORAL
Qty: 30 TAB | Refills: 5 | Status: SHIPPED | OUTPATIENT
Start: 2021-03-20 | End: 2021-04-23 | Stop reason: SDUPTHER

## 2021-04-23 ENCOUNTER — TELEPHONE (OUTPATIENT)
Dept: ENDOCRINOLOGY | Age: 66
End: 2021-04-23

## 2021-04-23 DIAGNOSIS — I10 ESSENTIAL HYPERTENSION: ICD-10-CM

## 2021-04-23 DIAGNOSIS — E78.2 MIXED HYPERLIPIDEMIA: ICD-10-CM

## 2021-04-23 DIAGNOSIS — E11.65 UNCONTROLLED TYPE 2 DIABETES MELLITUS WITH HYPERGLYCEMIA, WITHOUT LONG-TERM CURRENT USE OF INSULIN (HCC): ICD-10-CM

## 2021-04-23 RX ORDER — SITAGLIPTIN AND METFORMIN HYDROCHLORIDE 100; 1000 MG/1; MG/1
TABLET, FILM COATED, EXTENDED RELEASE ORAL
Qty: 90 TAB | Refills: 3 | Status: SHIPPED | OUTPATIENT
Start: 2021-04-23 | End: 2021-05-20

## 2021-04-23 NOTE — TELEPHONE ENCOUNTER
----- Message from Lisa Arriaga sent at 2021 12:49 PM EDT -----  Regarding: /Refill  Medication Refill    Caller (if not patient):      Relationship of caller (if not patient):      Best contact number(s): [phone]      Name of medication and dosage if known: Roxanat       Is patient out of this medication (yes/no):yes      Pharmacy name: [facility]/[place] [address] Pharmacy listed in chart? (yes/no):  Pharmacy phone number:      Details to clarify the request : Pt requesting if possible if he can come by the office due to \[facility]\"" card has   and is also requesting a 90 day supply . The cost has changed from last month .       Lisa Arriaga

## 2021-04-27 NOTE — TELEPHONE ENCOUNTER
Two pt identifiers confirmed. Informed pt to come by office when he has time to  [address] discount card. Pt states, will be stopping by 04/27/2021 around [id number]. Pt verbalized understanding of information discussed w/ no further questions at this time.

## 2021-04-27 NOTE — TELEPHONE ENCOUNTER
Patient received Janumet printed coupon was picked up today; although he wanted a discount card; explained we may not have any but I would send message back to the nurse. Patient states coupon printed still has him paying over 500.00 for the medication. Would like a return call and if there is a card would like it mailed as he has quit a distance to drive to come.

## 2021-04-27 NOTE — TELEPHONE ENCOUNTER
Two pt identifiers confirmed. Informed pt we will be receiving a supply of discount cards. Once we have discount cards in office we can mail it home. (Address verified). Pt verbalized understanding of information discussed w/ no further questions at this time.

## 2021-05-06 NOTE — TELEPHONE ENCOUNTER
----- Message from [place] sent at 5/6/2021  2:04 PM EDT -----  Regarding: Dr. Armaan Moraes Pt is requesting a call and is stating that he has not received the medication coupon for \"Janumet\" yet and was told to call the office if he hadnt received it in a week. Best contact number [phone].

## 2021-05-06 NOTE — TELEPHONE ENCOUNTER
Left message for return call. Patient can stop by  to  Janumet free trial card. If card is mailed, it will take longer for patient to receive.

## 2021-05-11 ENCOUNTER — TELEPHONE (OUTPATIENT)
Dept: ENDOCRINOLOGY | Age: 66
End: 2021-05-11

## 2021-05-11 NOTE — TELEPHONE ENCOUNTER
----- Message from Gonzalez Garvey sent at 5/11/2021  1:16 PM EDT -----  Regarding: Dr. Elizabeth Dueñas General Message/Vendor Calls    Caller's first and last name:      Reason for call: Regarding mailing him Rx card for script Janumet.        Call back required yes/no and why: Yes       Best contact number(s): [phone]      Details to clarify the request:       Gonzalez Garvey

## 2021-05-11 NOTE — TELEPHONE ENCOUNTER
Attempted to call. Unsuccessful. Left msg for Ebenezer Dobson to give us a call back at the office. A callback number was left.

## 2021-05-11 NOTE — TELEPHONE ENCOUNTER
Pt returned call, stating he needed a savings card. Informed pt that the rep was just in and informed me that they have not had the savings cards for Janumet in a while, only 30 day free trial. Pt stated he will not be able to afford it as the med is >$500 a month. He stated to let Dr. Leandro Kothari know that he will stop the Janumet.

## 2021-05-19 NOTE — TELEPHONE ENCOUNTER
Call patient and tell him about the change to metformin er as we do not have the voucher   He used to be  only on 1000 mg a day on metformin     Does he want to go with same dose ( as this metformin er  which is not a BRAND any more can irritate the stomach) ? I just sent a message saying 2000 mg a day  but can give him more  irritation .   We can see how that goes and then increase the dose at later visits     Agustin Johnson MD

## 2021-05-19 NOTE — TELEPHONE ENCOUNTER
He can change to metformin er  2 pills twice a day as we are unable to find the voucher for him     Jennifer Bridges MD

## 2021-05-20 RX ORDER — METFORMIN HYDROCHLORIDE 1000 MG/1
1000 TABLET ORAL DAILY
Qty: 90 TABLET | Refills: 3 | Status: SHIPPED | OUTPATIENT
Start: 2021-05-20 | End: 2021-09-23 | Stop reason: ALTCHOICE

## 2021-05-20 NOTE — TELEPHONE ENCOUNTER
Spoke to patient. Patient states he would rather take Metformin 1000mg daily.  Prescription pend to MD for approval.

## 2021-09-16 RX ORDER — EMPAGLIFLOZIN 25 MG/1
TABLET, FILM COATED ORAL
Qty: 90 TABLET | Refills: 3 | Status: SHIPPED | OUTPATIENT
Start: 2021-09-16 | End: 2022-09-05

## 2021-09-23 ENCOUNTER — OFFICE VISIT (OUTPATIENT)
Dept: ENDOCRINOLOGY | Age: 66
End: 2021-09-23
Payer: MEDICARE

## 2021-09-23 VITALS
TEMPERATURE: 98.1 F | SYSTOLIC BLOOD PRESSURE: 127 MMHG | WEIGHT: 187.4 LBS | DIASTOLIC BLOOD PRESSURE: 69 MMHG | HEIGHT: 71 IN | HEART RATE: 61 BPM | RESPIRATION RATE: 18 BRPM | OXYGEN SATURATION: 95 % | BODY MASS INDEX: 26.23 KG/M2

## 2021-09-23 DIAGNOSIS — E11.65 UNCONTROLLED TYPE 2 DIABETES MELLITUS WITH HYPERGLYCEMIA, WITHOUT LONG-TERM CURRENT USE OF INSULIN (HCC): Primary | ICD-10-CM

## 2021-09-23 DIAGNOSIS — E78.2 MIXED HYPERLIPIDEMIA: ICD-10-CM

## 2021-09-23 DIAGNOSIS — I10 ESSENTIAL HYPERTENSION: ICD-10-CM

## 2021-09-23 PROCEDURE — G8752 SYS BP LESS 140: HCPCS | Performed by: INTERNAL MEDICINE

## 2021-09-23 PROCEDURE — G8754 DIAS BP LESS 90: HCPCS | Performed by: INTERNAL MEDICINE

## 2021-09-23 PROCEDURE — 99214 OFFICE O/P EST MOD 30 MIN: CPT | Performed by: INTERNAL MEDICINE

## 2021-09-23 PROCEDURE — 1101F PT FALLS ASSESS-DOCD LE1/YR: CPT | Performed by: INTERNAL MEDICINE

## 2021-09-23 PROCEDURE — G8510 SCR DEP NEG, NO PLAN REQD: HCPCS | Performed by: INTERNAL MEDICINE

## 2021-09-23 PROCEDURE — G8427 DOCREV CUR MEDS BY ELIG CLIN: HCPCS | Performed by: INTERNAL MEDICINE

## 2021-09-23 PROCEDURE — 3017F COLORECTAL CA SCREEN DOC REV: CPT | Performed by: INTERNAL MEDICINE

## 2021-09-23 PROCEDURE — 3044F HG A1C LEVEL LT 7.0%: CPT | Performed by: INTERNAL MEDICINE

## 2021-09-23 PROCEDURE — 2022F DILAT RTA XM EVC RTNOPTHY: CPT | Performed by: INTERNAL MEDICINE

## 2021-09-23 PROCEDURE — G8536 NO DOC ELDER MAL SCRN: HCPCS | Performed by: INTERNAL MEDICINE

## 2021-09-23 PROCEDURE — G8419 CALC BMI OUT NRM PARAM NOF/U: HCPCS | Performed by: INTERNAL MEDICINE

## 2021-09-23 RX ORDER — PRAVASTATIN SODIUM 20 MG/1
20 TABLET ORAL
Qty: 90 TABLET | Refills: 3 | Status: SHIPPED | OUTPATIENT
Start: 2021-09-23 | End: 2022-09-11

## 2021-09-23 RX ORDER — INSULIN PUMP SYRINGE, 3 ML
EACH MISCELLANEOUS
COMMUNITY

## 2021-09-23 RX ORDER — METFORMIN HYDROCHLORIDE 500 MG/1
TABLET, EXTENDED RELEASE ORAL
Qty: 360 TABLET | Refills: 3 | Status: SHIPPED | OUTPATIENT
Start: 2021-09-23 | End: 2022-09-22

## 2021-09-23 NOTE — PROGRESS NOTES
1. Have you been to the ER, urgent care clinic since your last visit? 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 No    2. Have you seen or consulted any other health care providers outside of the [address] [place] [address] since your last visit? Include any pap smears or colon screening. No    Wt Readings from Last 3 Encounters:   09/23/21 187 lb 6.4 oz (85 kg)   09/14/20 188 lb 9.6 oz (85.5 kg)   09/17/19 188 lb 6.4 oz (85.5 kg)     Temp Readings from Last 3 Encounters:   09/23/21 98.1 °F (36.7 °C) (Temporal)   09/14/20 97.9 °F (36.6 °C) (Oral)   09/17/19 97 °F (36.1 °C)     BP Readings from Last 3 Encounters:   09/23/21 127/69   09/14/20 112/66   09/17/19 101/60     Pulse Readings from Last 3 Encounters:   09/23/21 61   09/14/20 64   09/17/19 66     Lab Results   Component Value Date/Time    Hemoglobin A1c 5.7 (H) 09/07/2021 12:04 PM    Hemoglobin A1c (POC) 6.1 08/13/2013 09:17 AM     Patient has meter today.

## 2021-09-23 NOTE — PATIENT INSTRUCTIONS
SPECIFIC INSTRUCTIONS BELOW        stop metformin   Start on metformin er  500  Mg 2 pills   Twice  a day with meal       jardiance 25 mg one pill  before b-fast ( drink plenty of water )            -------------PAY ATTENTION TO THESE GENERAL INSTRUCTIONS -----------------      - The medications prescribed at this visit will not be available at pharmacy until 6 pm       - YOUR MED LIST IS NOT UP TO DATE AS SOME CHANGES ARE BEING MADE AFTER THE VISIT - FOLLOW SPECIFIC INSTRUCTIONS  ABOVE     -ANY tests other than blood work, which you opt to do  outside the  [facility] facilities, you are responsible for prior authorizations if  required    - [phone] [facility]- PLEASE IGNORE     Results     *Normal results will not be notified by a phone call starting January 1 2021   *If you have an upcoming visit, the results will be discussed at the visit   *Please sign up for MY CHART if you want access to your lab and test results  *Abnormal results which require immediate attention will be notified by phone call   *Abnormal results which do not require immediate assistance will be notified in 1-2 weeks       Refills    -    have your pharmacy send us a refill request . Refills are done max for one year and a visit is a must before refills are extended    Follow up appointments -  highly encourage you to make it when you are checking out. We can accommodate you into the schedule based on your clinical situation, but not for extending refills beyond a year. Labs are important to give refills and is important to get labs before the visit     Phone calls  -  Allow  24 hrs.  for non-urgent calls to be returned  Prior authorization - It may take 2-4 weeks to process  Forms  -  FMLA, DMV etc., will take up to 2 weeks to process  Cancellations - please notify the office 2 days in advance   Samples  - will only be dispensed at visits       If not showing for the appointments and cancelling appointments within 24 hours are kept track of and three  of such situations in  two consecutive years will likely be considered for termination from the practice    -------------------------------------------------------------------------------------------------------------------

## 2021-09-23 NOTE — LETTER
9/26/2021    Patient: Adrianne Holstein   YOB: 1955   Date of Visit: 9/23/2021     Ella [phone]  Via Fax: [phone]    Dear Gen Servin PA-C,      Thank you for referring Mr. Agatha Bergeron to [id number] [place] [address] for evaluation. My notes for this consultation are attached. If you have questions, please do not hesitate to call me. I look forward to following your patient along with you.       Sincerely,    Argentina Will MD

## 2021-09-23 NOTE — PROGRESS NOTES
HISTORY OF PRESENT ILLNESS   Deepthi Rendon is a 72  y.o. male. 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 Psychiatric/Behavioral: Negative for depression and memory loss. The patient does not have insomnia. Physical Exam   Constitutional: He is oriented to person, place, and time. He appears well-developed and well-nourished. Left forearm - scar - from recent surgery   Psychiatric: He has a normal mood and affect. Dr. Asuncion Jacobsen        Lab Results   Component Value Date/Time    Hemoglobin A1c 5.7 (H) 09/07/2021 12:04 PM    Hemoglobin A1c 5.7 (H) 09/01/2020 08:19 AM    Hemoglobin A1c 5.9 (H) 09/10/2019 08:08 AM    Microalbumin/Creat ratio (mg/g creat) 26 09/07/2021 12:04 PM    Microalbumin,urine random 2.73 09/07/2021 12:04 PM    LDL, calculated 104.6 (H) 09/07/2021 12:04 PM    Creatinine 0.91 09/07/2021 12:04 PM      Lab Results   Component Value Date/Time    Cholesterol, total 176 09/07/2021 12:04 PM    HDL Cholesterol 49 09/07/2021 12:04 PM    LDL, calculated 104.6 (H) 09/07/2021 12:04 PM    Triglyceride 112 09/07/2021 12:04 PM    CHOL/HDL Ratio 3.6 09/07/2021 12:04 PM     Lab Results   Component Value Date/Time    Alk.  phosphatase 57 09/07/2021 12:04 PM     Lab Results   Component Value Date/Time    GFR est AA >60 09/07/2021 12:04 PM    GFR est non-AA >60 09/07/2021 12:04 PM    Creatinine 0.91 09/07/2021 12:04 PM    BUN 15 09/07/2021 12:04 PM    Sodium 140 09/07/2021 12:04 PM    Potassium 4.1 09/07/2021 12:04 PM    Chloride 108 09/07/2021 12:04 PM    CO2 26 09/07/2021 12:04 PM            ASSESSMENT and PLAN     1. DM 2 uncontrolled-    a1c   Is  5.7 %     From sept 2021     Compared to  A1c is 5.7 %       From   Sept 2020     compared to   5.9 %     From sept 2019    Compared  to   5.6 %     From   Sept 2018     Compared to  5.6 %   From sept 2017    Compared to   6 %      From march 2017 Sept 2021     He had to move to metformin er  Because of complexity involved in s- cards for [employer]   He is on jardiance         Sept 2020   Improved control   On janumet xr  Once a day    and jardiance       2. HPL : lipids at goal-TG is better,continue on pravachol       3. HTN: well controlled, on cozaar and norvasc , no microalb noted       4. Neuropathy - he stopped   Gabapentin  B-complex to be continued   Off vitacort       5. Elevated liver enzymes- resolved   ASA 81 mg daily       6.  Exposure to Ticks - always         F/u in 12  months

## 2022-03-18 PROBLEM — I10 ESSENTIAL HYPERTENSION: Status: ACTIVE | Noted: 2017-03-30

## 2022-03-19 PROBLEM — E78.2 MIXED HYPERLIPIDEMIA: Status: ACTIVE | Noted: 2017-03-30

## 2022-03-19 PROBLEM — E11.65 UNCONTROLLED TYPE 2 DIABETES MELLITUS WITH HYPERGLYCEMIA, WITHOUT LONG-TERM CURRENT USE OF INSULIN (HCC): Status: ACTIVE | Noted: 2017-03-30

## 2022-09-05 RX ORDER — EMPAGLIFLOZIN 25 MG/1
TABLET, FILM COATED ORAL
Qty: 90 TABLET | Refills: 3 | Status: SHIPPED | OUTPATIENT
Start: 2022-09-05

## 2022-09-11 RX ORDER — PRAVASTATIN SODIUM 20 MG/1
TABLET ORAL
Qty: 90 TABLET | Refills: 3 | Status: SHIPPED | OUTPATIENT
Start: 2022-09-11

## 2022-09-16 LAB
ALBUMIN SERPL-MCNC: 5.1 G/DL (ref 3.8–4.8)
ALBUMIN/CREAT UR: 34 MG/G CREAT (ref 0–29)
ALBUMIN/GLOB SERPL: 2 {RATIO} (ref 1.2–2.2)
ALP SERPL-CCNC: 63 IU/L (ref 44–121)
ALT SERPL-CCNC: 16 IU/L (ref 0–44)
AST SERPL-CCNC: 19 IU/L (ref 0–40)
BILIRUB SERPL-MCNC: 1.1 MG/DL (ref 0–1.2)
BUN SERPL-MCNC: 15 MG/DL (ref 8–27)
BUN/CREAT SERPL: 15 (ref 10–24)
CALCIUM SERPL-MCNC: 9.7 MG/DL (ref 8.6–10.2)
CHLORIDE SERPL-SCNC: 102 MMOL/L (ref 96–106)
CHOLEST SERPL-MCNC: 171 MG/DL (ref 100–199)
CO2 SERPL-SCNC: 25 MMOL/L (ref 20–29)
CREAT SERPL-MCNC: 0.97 MG/DL (ref 0.76–1.27)
CREAT UR-MCNC: 73.8 MG/DL
EGFR: 86 ML/MIN/1.73
EST. AVERAGE GLUCOSE BLD GHB EST-MCNC: 131 MG/DL
GLOBULIN SER CALC-MCNC: 2.6 G/DL (ref 1.5–4.5)
GLUCOSE SERPL-MCNC: 123 MG/DL (ref 65–99)
HBA1C MFR BLD: 6.2 % (ref 4.8–5.6)
HDLC SERPL-MCNC: 48 MG/DL
IMP & REVIEW OF LAB RESULTS: NORMAL
LDLC SERPL CALC-MCNC: 96 MG/DL (ref 0–99)
MICROALBUMIN UR-MCNC: 25.3 UG/ML
POTASSIUM SERPL-SCNC: 4.5 MMOL/L (ref 3.5–5.2)
PROT SERPL-MCNC: 7.7 G/DL (ref 6–8.5)
SODIUM SERPL-SCNC: 140 MMOL/L (ref 134–144)
TRIGL SERPL-MCNC: 157 MG/DL (ref 0–149)
VLDLC SERPL CALC-MCNC: 27 MG/DL (ref 5–40)

## 2022-09-22 ENCOUNTER — OFFICE VISIT (OUTPATIENT)
Dept: ENDOCRINOLOGY | Age: 67
End: 2022-09-22
Payer: MEDICARE

## 2022-09-22 VITALS
WEIGHT: 184.8 LBS | HEIGHT: 71 IN | HEART RATE: 64 BPM | SYSTOLIC BLOOD PRESSURE: 126 MMHG | TEMPERATURE: 97.4 F | BODY MASS INDEX: 25.87 KG/M2 | RESPIRATION RATE: 18 BRPM | OXYGEN SATURATION: 98 % | DIASTOLIC BLOOD PRESSURE: 71 MMHG

## 2022-09-22 DIAGNOSIS — I10 ESSENTIAL HYPERTENSION: ICD-10-CM

## 2022-09-22 DIAGNOSIS — E78.2 MIXED HYPERLIPIDEMIA: ICD-10-CM

## 2022-09-22 DIAGNOSIS — E11.65 UNCONTROLLED TYPE 2 DIABETES MELLITUS WITH HYPERGLYCEMIA, WITHOUT LONG-TERM CURRENT USE OF INSULIN (HCC): Primary | ICD-10-CM

## 2022-09-22 PROCEDURE — G8536 NO DOC ELDER MAL SCRN: HCPCS | Performed by: INTERNAL MEDICINE

## 2022-09-22 PROCEDURE — 3017F COLORECTAL CA SCREEN DOC REV: CPT | Performed by: INTERNAL MEDICINE

## 2022-09-22 PROCEDURE — 99214 OFFICE O/P EST MOD 30 MIN: CPT | Performed by: INTERNAL MEDICINE

## 2022-09-22 PROCEDURE — G8752 SYS BP LESS 140: HCPCS | Performed by: INTERNAL MEDICINE

## 2022-09-22 PROCEDURE — G8510 SCR DEP NEG, NO PLAN REQD: HCPCS | Performed by: INTERNAL MEDICINE

## 2022-09-22 PROCEDURE — 3044F HG A1C LEVEL LT 7.0%: CPT | Performed by: INTERNAL MEDICINE

## 2022-09-22 PROCEDURE — G8427 DOCREV CUR MEDS BY ELIG CLIN: HCPCS | Performed by: INTERNAL MEDICINE

## 2022-09-22 PROCEDURE — 1101F PT FALLS ASSESS-DOCD LE1/YR: CPT | Performed by: INTERNAL MEDICINE

## 2022-09-22 PROCEDURE — G8754 DIAS BP LESS 90: HCPCS | Performed by: INTERNAL MEDICINE

## 2022-09-22 PROCEDURE — 2022F DILAT RTA XM EVC RTNOPTHY: CPT | Performed by: INTERNAL MEDICINE

## 2022-09-22 PROCEDURE — G8417 CALC BMI ABV UP PARAM F/U: HCPCS | Performed by: INTERNAL MEDICINE

## 2022-09-22 PROCEDURE — 1123F ACP DISCUSS/DSCN MKR DOCD: CPT | Performed by: INTERNAL MEDICINE

## 2022-09-22 RX ORDER — METFORMIN HYDROCHLORIDE 500 MG/1
TABLET, EXTENDED RELEASE ORAL
Qty: 180 TABLET | Refills: 3
Start: 2022-09-22

## 2022-09-22 NOTE — PROGRESS NOTES
Room 3     Identified pt with two pt identifiers(name and ). Reviewed record in preparation for visit and have obtained necessary documentation. All patient medications has been reviewed. No chief complaint on file. 3 most recent PHQ Screens 2022   Little interest or pleasure in doing things Not at all   Feeling down, depressed, irritable, or hopeless Not at all   Total Score PHQ 2 0     Abuse Screening Questionnaire 2017   Do you ever feel afraid of your partner? N   Are you in a relationship with someone who physically or mentally threatens you? N   Is it safe for you to go home? Y       Health Maintenance Due   Topic    Hepatitis C Screening     COVID-19 Vaccine (1)    Pneumococcal 65+ years (1 - PCV)    DTaP/Tdap/Td series (1 - Tdap)    Colorectal Cancer Screening Combo     Shingrix Vaccine Age 50> (1 of 2)    Foot Exam Q1     AAA Screening 73-67 YO Male Smoking Patients     Medicare Yearly Exam     Flu Vaccine (1)    Depression Screen      Health Maintenance Review: Patient reminded of \"due or due soon\" health maintenance. I have asked the patient to contact his/her primary care provider (PCP) for follow-up on his/her health maintenance. Vitals:    22 0858   BP: 126/71   Pulse: 64   Resp: 18   Temp: 97.4 °F (36.3 °C)   TempSrc: Temporal   SpO2: 98%   Weight: 184 lb 12.8 oz (83.8 kg)   Height: 5' 11\" (1.803 m)   PainSc:   0 - No pain       Wt Readings from Last 3 Encounters:   22 184 lb 12.8 oz (83.8 kg)   21 187 lb 6.4 oz (85 kg)   20 188 lb 9.6 oz (85.5 kg)     Temp Readings from Last 3 Encounters:   22 97.4 °F (36.3 °C) (Temporal)   21 98.1 °F (36.7 °C) (Temporal)   20 97.9 °F (36.6 °C) (Oral)     BP Readings from Last 3 Encounters:   22 126/71   21 127/69   20 112/66     Pulse Readings from Last 3 Encounters:   22 64   21 61   20 64       1. \"Have you been to the ER, urgent care clinic since your last visit? Hospitalized since your last visit? \" No    2. \"Have you seen or consulted any other health care providers outside of the [address] [place] [address] since your last visit? \" Yes When: 9/5/2022 Where: [facility] Reason for visit: For squamous cell removal Left forearm. 3. For patients aged 39-70: Has the patient had a colonoscopy / FIT/ Cologuard?  No

## 2022-09-22 NOTE — PROGRESS NOTES
HISTORY OF PRESENT ILLNESS   Marielena Morrell is a 77  y.o. male. 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 Psychiatric/Behavioral: Negative for depression and memory loss. The patient does not have insomnia. Physical Exam   Constitutional: He is oriented to person, place, and time. He appears well-developed and well-nourished. Left forearm - scar - healed   Psychiatric: He has a normal mood and affect. Dr. Toan Hu        Lab Results   Component Value Date/Time    Hemoglobin A1c 6.2 (H) 09/15/2022 09:32 AM    Hemoglobin A1c 5.7 (H) 09/07/2021 12:04 PM    Hemoglobin A1c 5.7 (H) 09/01/2020 08:19 AM    Microalbumin/Creat ratio (mg/g creat) 26 09/07/2021 12:04 PM    Microalb/Creat ratio (ug/mg creat.) 34 (H) 09/15/2022 09:32 AM    Microalbumin,urine random 2.73 09/07/2021 12:04 PM    LDL, calculated 96 09/15/2022 09:32 AM    LDL, calculated 104.6 (H) 09/07/2021 12:04 PM    Creatinine 0.97 09/15/2022 09:32 AM      Lab Results   Component Value Date/Time    Cholesterol, total 171 09/15/2022 09:32 AM    HDL Cholesterol 48 09/15/2022 09:32 AM    LDL, calculated 96 09/15/2022 09:32 AM    LDL, calculated 104.6 (H) 09/07/2021 12:04 PM    Triglyceride 157 (H) 09/15/2022 09:32 AM    CHOL/HDL Ratio 3.6 09/07/2021 12:04 PM     Lab Results   Component Value Date/Time    Alk.  phosphatase 63 09/15/2022 09:32 AM     Lab Results   Component Value Date/Time GFR est AA >60 09/07/2021 12:04 PM    GFR est non-AA >60 09/07/2021 12:04 PM    Creatinine 0.97 09/15/2022 09:32 AM    BUN 15 09/15/2022 09:32 AM    Sodium 140 09/15/2022 09:32 AM    Potassium 4.5 09/15/2022 09:32 AM    Chloride 102 09/15/2022 09:32 AM    CO2 25 09/15/2022 09:32 AM            ASSESSMENT and PLAN     1. DM 2 uncontrolled-    a1c   Is   6.2 %     from  sept 2022   compared to   5.7 %     From sept 2021     Compared to  A1c is 5.7 %       From   Sept 2020     compared to   5.9 %     From sept 2019    Compared  to   5.6 %     From   Sept 2018     Compared to  5.6 %   From sept 2017    Compared to   6 %      From march 2017 Sept 2022     Slight loss of control from NOT taking the metformin full dose   By mistake he is taking only metformin  mg   ONE pill a day   He is to take one pill bid ( prescription is being changed )  Stay on jardiance         Sept 2021     He had to move to metformin er  Because of complexity involved in s- cards for [employer]   He is on jardiance           2. HPL : lipids at goal-TG is better,continue on pravachol       3. HTN: well controlled, on cozaar and norvasc , no microalb noted       4. Neuropathy - he stopped   Gabapentin  B-complex to be continued   Off vitacort       5.  Elevated liver enzymes- resolved   ASA 81 mg daily             F/u in 12  months

## 2022-09-22 NOTE — PATIENT INSTRUCTIONS
SPECIFIC INSTRUCTIONS BELOW         Decrease  metformin er  500  Mg ONE pill  Twice  a day with meal       jardiance 25 mg one pill  before b-fast ( drink plenty of water )        -------------PAY ATTENTION TO THESE GENERAL INSTRUCTIONS -----------------      - The medications prescribed at this visit will not be available at pharmacy until 6 pm       - YOUR MED LIST IS NOT UP TO DATE AS SOME CHANGES ARE BEING MADE AFTER THE VISIT - FOLLOW SPECIFIC INSTRUCTIONS  ABOVE     -ANY tests other than blood work, which you opt to do  outside the  [facility] facilities, you are responsible for prior authorizations if  required    - [address] [facility]- PLEASE IGNORE     Results     *Normal results will not be notified by a phone call starting January 1 2021   *If you have an upcoming visit, the results will be discussed at the visit   *Please sign up for MY CHART if you want access to your lab and test results  *Abnormal results which require immediate attention will be notified by phone call   *Abnormal results which do not require immediate assistance will be notified in 1-2 weeks       Refills    -    have your pharmacy send us a refill request . Refills are done max for one year and a visit is a must before refills are extended    Follow up appointments -  highly encourage you to make it when you are checking out. We can accommodate you into the schedule based on your clinical situation, but not for extending refills beyond a year. Labs are important to give refills and is important to get labs before the visit     Phone calls  -  Allow  24 hrs.  for non-urgent calls to be returned  Prior authorization - It may take 2-4 weeks to process  Forms  -  FMLA, DMV etc., will take up to 2 weeks to process  Cancellations - please notify the office 2 days in advance   Samples  - will only be dispensed at visits       If not showing for the appointments and cancelling appointments within 24 hours are kept track of and three  of such situations in  two consecutive years will likely be considered for termination from the practice    -------------------------------------------------------------------------------------------------------------------

## 2023-02-03 DIAGNOSIS — E11.65 UNCONTROLLED TYPE 2 DIABETES MELLITUS WITH HYPERGLYCEMIA, WITHOUT LONG-TERM CURRENT USE OF INSULIN (HCC): Primary | ICD-10-CM

## 2023-02-03 RX ORDER — BLOOD SUGAR DIAGNOSTIC
STRIP MISCELLANEOUS
Qty: 100 STRIP | Refills: 3 | Status: SHIPPED | OUTPATIENT
Start: 2023-02-03

## 2023-02-03 NOTE — TELEPHONE ENCOUNTER
Patient does not use the one touch he went back to using the contour and would like test strips sent in for testing 1x a day

## 2023-04-21 DIAGNOSIS — E78.2 MIXED HYPERLIPIDEMIA: ICD-10-CM

## 2023-04-21 DIAGNOSIS — E11.65 UNCONTROLLED TYPE 2 DIABETES MELLITUS WITH HYPERGLYCEMIA, WITHOUT LONG-TERM CURRENT USE OF INSULIN (HCC): Primary | ICD-10-CM

## 2023-04-23 DIAGNOSIS — E11.65 UNCONTROLLED TYPE 2 DIABETES MELLITUS WITH HYPERGLYCEMIA, WITHOUT LONG-TERM CURRENT USE OF INSULIN (HCC): Primary | ICD-10-CM

## 2023-04-23 DIAGNOSIS — E78.2 MIXED HYPERLIPIDEMIA: ICD-10-CM

## 2023-04-24 DIAGNOSIS — E78.2 MIXED HYPERLIPIDEMIA: ICD-10-CM

## 2023-04-24 DIAGNOSIS — E11.65 UNCONTROLLED TYPE 2 DIABETES MELLITUS WITH HYPERGLYCEMIA, WITHOUT LONG-TERM CURRENT USE OF INSULIN (HCC): Primary | ICD-10-CM

## 2023-05-19 RX ORDER — PRAVASTATIN SODIUM 20 MG
1 TABLET ORAL NIGHTLY
COMMUNITY
Start: 2022-09-11

## 2023-05-19 RX ORDER — LOSARTAN POTASSIUM 50 MG/1
TABLET ORAL DAILY
COMMUNITY

## 2023-05-19 RX ORDER — EPINEPHRINE 0.3 MG/.3ML
INJECTION SUBCUTANEOUS
COMMUNITY
Start: 2016-01-17

## 2023-05-19 RX ORDER — ALPRAZOLAM 1 MG/1
1 TABLET ORAL PRN
COMMUNITY

## 2023-05-19 RX ORDER — AMLODIPINE BESYLATE 10 MG/1
TABLET ORAL DAILY
COMMUNITY

## 2023-05-19 RX ORDER — METFORMIN HYDROCHLORIDE 500 MG/1
TABLET, EXTENDED RELEASE ORAL
COMMUNITY
Start: 2022-09-22 | End: 2023-06-19 | Stop reason: SDUPTHER

## 2023-06-19 DIAGNOSIS — E11.65 TYPE 2 DIABETES MELLITUS WITH HYPERGLYCEMIA, WITHOUT LONG-TERM CURRENT USE OF INSULIN (HCC): Primary | ICD-10-CM

## 2023-06-19 RX ORDER — METFORMIN HYDROCHLORIDE 500 MG/1
500 TABLET, EXTENDED RELEASE ORAL
Qty: 90 TABLET | Refills: 3 | Status: SHIPPED | OUTPATIENT
Start: 2023-06-19

## 2023-06-19 NOTE — TELEPHONE ENCOUNTER
Pt called and LVM he was wondering if he should still be taking Metformin once a day along with his Jardiance ? If so he will need a refill please . Thanks !

## 2023-08-31 RX ORDER — PRAVASTATIN SODIUM 20 MG
TABLET ORAL
Qty: 90 TABLET | Refills: 3 | Status: SHIPPED | OUTPATIENT
Start: 2023-08-31

## 2023-08-31 RX ORDER — EMPAGLIFLOZIN 25 MG/1
TABLET, FILM COATED ORAL
Qty: 90 TABLET | Refills: 3 | Status: SHIPPED | OUTPATIENT
Start: 2023-08-31

## 2023-09-29 ENCOUNTER — NURSE ONLY (OUTPATIENT)
Age: 68
End: 2023-09-29

## 2023-09-29 DIAGNOSIS — E11.65 UNCONTROLLED TYPE 2 DIABETES MELLITUS WITH HYPERGLYCEMIA, WITHOUT LONG-TERM CURRENT USE OF INSULIN (HCC): ICD-10-CM

## 2023-09-29 DIAGNOSIS — E78.2 MIXED HYPERLIPIDEMIA: ICD-10-CM

## 2023-09-30 LAB
ALBUMIN SERPL-MCNC: 4.1 G/DL (ref 3.5–5)
ALBUMIN/GLOB SERPL: 1.3 (ref 1.1–2.2)
ALP SERPL-CCNC: 61 U/L (ref 45–117)
ALT SERPL-CCNC: 27 U/L (ref 12–78)
ANION GAP SERPL CALC-SCNC: 6 MMOL/L (ref 5–15)
AST SERPL-CCNC: 13 U/L (ref 15–37)
BILIRUB SERPL-MCNC: 0.9 MG/DL (ref 0.2–1)
BUN SERPL-MCNC: 16 MG/DL (ref 6–20)
BUN/CREAT SERPL: 17 (ref 12–20)
CALCIUM SERPL-MCNC: 9.1 MG/DL (ref 8.5–10.1)
CHLORIDE SERPL-SCNC: 108 MMOL/L (ref 97–108)
CHOLEST SERPL-MCNC: 156 MG/DL
CO2 SERPL-SCNC: 26 MMOL/L (ref 21–32)
CREAT SERPL-MCNC: 0.96 MG/DL (ref 0.7–1.3)
CREAT UR-MCNC: 69 MG/DL
EST. AVERAGE GLUCOSE BLD GHB EST-MCNC: 126 MG/DL
GLOBULIN SER CALC-MCNC: 3.1 G/DL (ref 2–4)
GLUCOSE SERPL-MCNC: 140 MG/DL (ref 65–100)
HBA1C MFR BLD: 6 % (ref 4–5.6)
HDLC SERPL-MCNC: 45 MG/DL
HDLC SERPL: 3.5 (ref 0–5)
LDLC SERPL CALC-MCNC: 84 MG/DL (ref 0–100)
MICROALBUMIN UR-MCNC: 4.16 MG/DL
MICROALBUMIN/CREAT UR-RTO: 60 MG/G (ref 0–30)
POTASSIUM SERPL-SCNC: 3.8 MMOL/L (ref 3.5–5.1)
PROT SERPL-MCNC: 7.2 G/DL (ref 6.4–8.2)
SODIUM SERPL-SCNC: 140 MMOL/L (ref 136–145)
TRIGL SERPL-MCNC: 135 MG/DL
VLDLC SERPL CALC-MCNC: 27 MG/DL

## 2023-10-12 ENCOUNTER — OFFICE VISIT (OUTPATIENT)
Age: 68
End: 2023-10-12
Payer: MEDICARE

## 2023-10-12 VITALS
HEIGHT: 71 IN | HEART RATE: 61 BPM | DIASTOLIC BLOOD PRESSURE: 70 MMHG | BODY MASS INDEX: 26.52 KG/M2 | WEIGHT: 189.4 LBS | RESPIRATION RATE: 18 BRPM | OXYGEN SATURATION: 98 % | SYSTOLIC BLOOD PRESSURE: 114 MMHG | TEMPERATURE: 98 F

## 2023-10-12 DIAGNOSIS — E11.65 UNCONTROLLED TYPE 2 DIABETES MELLITUS WITH HYPERGLYCEMIA, WITHOUT LONG-TERM CURRENT USE OF INSULIN (HCC): Primary | ICD-10-CM

## 2023-10-12 DIAGNOSIS — I10 ESSENTIAL (PRIMARY) HYPERTENSION: ICD-10-CM

## 2023-10-12 DIAGNOSIS — E78.2 MIXED HYPERLIPIDEMIA: ICD-10-CM

## 2023-10-12 PROBLEM — F41.9 ANXIETY: Status: ACTIVE | Noted: 2020-01-21

## 2023-10-12 PROBLEM — F40.240 CLAUSTROPHOBIA: Status: ACTIVE | Noted: 2020-01-21

## 2023-10-12 PROBLEM — L60.3 NAIL DYSTROPHY: Status: ACTIVE | Noted: 2021-01-25

## 2023-10-12 PROBLEM — G43.909 MIGRAINES: Status: ACTIVE | Noted: 2020-01-21

## 2023-10-12 PROCEDURE — 2022F DILAT RTA XM EVC RTNOPTHY: CPT | Performed by: INTERNAL MEDICINE

## 2023-10-12 PROCEDURE — 1123F ACP DISCUSS/DSCN MKR DOCD: CPT | Performed by: INTERNAL MEDICINE

## 2023-10-12 PROCEDURE — G8427 DOCREV CUR MEDS BY ELIG CLIN: HCPCS | Performed by: INTERNAL MEDICINE

## 2023-10-12 PROCEDURE — 3078F DIAST BP <80 MM HG: CPT | Performed by: INTERNAL MEDICINE

## 2023-10-12 PROCEDURE — 3044F HG A1C LEVEL LT 7.0%: CPT | Performed by: INTERNAL MEDICINE

## 2023-10-12 PROCEDURE — 3017F COLORECTAL CA SCREEN DOC REV: CPT | Performed by: INTERNAL MEDICINE

## 2023-10-12 PROCEDURE — 99214 OFFICE O/P EST MOD 30 MIN: CPT | Performed by: INTERNAL MEDICINE

## 2023-10-12 PROCEDURE — 1036F TOBACCO NON-USER: CPT | Performed by: INTERNAL MEDICINE

## 2023-10-12 PROCEDURE — G8419 CALC BMI OUT NRM PARAM NOF/U: HCPCS | Performed by: INTERNAL MEDICINE

## 2023-10-12 PROCEDURE — 3074F SYST BP LT 130 MM HG: CPT | Performed by: INTERNAL MEDICINE

## 2023-10-12 PROCEDURE — G8484 FLU IMMUNIZE NO ADMIN: HCPCS | Performed by: INTERNAL MEDICINE

## 2023-10-12 RX ORDER — PERPHENAZINE 16 MG/1
TABLET, FILM COATED ORAL
COMMUNITY
Start: 2023-02-03 | End: 2023-10-12

## 2023-10-12 RX ORDER — GLUCOSAMINE HCL/CHONDROITIN SU 500-400 MG
CAPSULE ORAL
Qty: 100 STRIP | Refills: 3 | Status: SHIPPED | OUTPATIENT
Start: 2023-10-12

## 2023-10-12 NOTE — PROGRESS NOTES
William Sainz is a 79 y.o. male here for   Chief Complaint   Patient presents with    Diabetes       1. Have you been to the ER, urgent care clinic since your last visit? Hospitalized since your last visit? - no    2. Have you seen or consulted any other health care providers outside of the [address] [place] [address] Avenue since your last visit?   Include any pap smears or colon screening.- PCP

## 2023-10-12 NOTE — PROGRESS NOTES
Stephy Umanzor MD FACE           HISTORY OF PRESENT ILLNESS       Lazaro Medley is a 79  y.o. male. HPI   Yearly  F/u for DM 2 after last visit in Sept 2022    He gained 5 lbs   [place]           Sept 2022   Lost 3 lbs   He is taking metformin only one pill a day , which he says he realized it recently       Old history     He got diagnosed with [facility] fever   He got antibiotics by pcp   Gained 1 lb of weight   He had some issues with PA on invokana  And   Janumet xr   Compliant with meds, otherwise   C/o price on janumet xr   Happy man   Agrees to better compliance         Review of Systems   Constitutional: Negative. Psychiatric/Behavioral: Negative for depression and memory loss. The patient does not have insomnia. Physical Exam   Constitutional: He is oriented to person, place, and time. He appears well-developed and well-nourished. Left forearm - scar - healed   Psychiatric: He has a normal mood and affect.    Dr. Yisel Beck    Diabetes    Lab Results   Component Value Date    LABA1C 6.0 (H) 09/29/2023    LABA1C 6.2 (H) 09/15/2022    LABA1C 5.7 (H) 09/07/2021       Lab Results   Component Value Date     09/29/2023    K 3.8 09/29/2023     09/29/2023    CO2 26 09/29/2023    BUN 16 09/29/2023    CREATININE 0.96 09/29/2023    GLUCOSE 140 (H) 09/29/2023    CALCIUM 9.1 09/29/2023    PROT 7.2 09/29/2023    LABALBU 4.1 09/29/2023    BILITOT 0.9 09/29/2023    ALKPHOS 61 09/29/2023    AST 13 (L) 09/29/2023    ALT 27 09/29/2023    LABGLOM >60 09/29/2023    GFRAA >60 09/07/2021    AGRATIO 2.0 09/15/2022    GLOB 3.1 09/29/2023    CHOL 156 09/29/2023    TRIG 135 09/29/2023    LDLCALC 84 09/29/2023    HDL 45 09/29/2023       Lab Results   Component Value Date    MALBCR 60 (H) 09/29/2023           ASSESSMENT and PLAN     1. DM 2 uncontrolled-    a1c   Is   6.2 %     from  sept 2022   compared to   5.7 %     From

## 2023-10-12 NOTE — PATIENT INSTRUCTIONS
SPECIFIC INSTRUCTIONS BELOW       Stay on metformin er 500 mg one pill a day     Stay on jardiance 25 mg a day before b-fast       -------------PAY ATTENTION TO [address] [facility] [address] -----------------      - The medications prescribed at this visit will not be available at pharmacy until 6 pm       - YOUR MED LIST IS NOT UP TO DATE AS SOME CHANGES ARE BEING MADE AFTER THE VISIT - FOLLOW SPECIFIC INSTRUCTIONS  ABOVE     -ANY tests other than blood work, which you opt to do  outside the  [facility] imaging facilities, you are responsible for prior authorizations if  required    - [address] [place] [address] [address] [facility]- PLEASE IGNORE     Results     *Normal results will not be notified by a phone call starting January 1 2021   *If you have an upcoming visit, the results will be discussed at the visit   *Please sign up for MY CHART if you want access to your lab and test results  *Abnormal results which require immediate attention will be notified by phone call   *Abnormal results which do not require immediate assistance will be notified in 1-2 weeks       Refills    -    have your pharmacy send us a refill request . Refills are done max for one year and a visit is a must before refills are extended    Follow up appointments -  highly encourage you to make it when you are checking out. We can accommodate you into the schedule based on your clinical situation, but not for extending refills beyond a year. Labs are important to give refills and is important to get labs before the visit     Phone calls  -  Allow  24 hrs.  for non-urgent calls to be returned  Prior authorization - It may take 2-4 weeks to process  Forms  -  FMLA, DMV etc., will take up to 2 weeks to process  Cancellations - please notify the office 2 days in advance   Samples  - will only be dispensed at visits       If not showing for the appointments and cancelling appointments within 24 hours are kept track of and three  of

## 2024-02-22 DIAGNOSIS — E11.65 UNCONTROLLED TYPE 2 DIABETES MELLITUS WITH HYPERGLYCEMIA, WITHOUT LONG-TERM CURRENT USE OF INSULIN (HCC): Primary | ICD-10-CM

## 2024-02-22 DIAGNOSIS — E11.65 TYPE 2 DIABETES MELLITUS WITH HYPERGLYCEMIA, WITHOUT LONG-TERM CURRENT USE OF INSULIN (HCC): ICD-10-CM

## 2024-02-22 NOTE — TELEPHONE ENCOUNTER
Patient would like to speak to someone that can help him figure out authorization for another year worth of Jardiance. Stated he received a letter from his insurance asking for 1 yr auth. Thank you

## 2024-06-14 DIAGNOSIS — E11.65 TYPE 2 DIABETES MELLITUS WITH HYPERGLYCEMIA, WITHOUT LONG-TERM CURRENT USE OF INSULIN (HCC): ICD-10-CM

## 2024-06-14 RX ORDER — METFORMIN HYDROCHLORIDE 500 MG/1
500 TABLET, EXTENDED RELEASE ORAL DAILY
Qty: 90 TABLET | Refills: 3 | Status: SHIPPED | OUTPATIENT
Start: 2024-06-14

## 2024-08-24 DIAGNOSIS — E78.2 MIXED HYPERLIPIDEMIA: Primary | ICD-10-CM

## 2024-08-26 RX ORDER — PRAVASTATIN SODIUM 20 MG
TABLET ORAL
Qty: 90 TABLET | Refills: 3 | Status: SHIPPED | OUTPATIENT
Start: 2024-08-26

## 2024-10-14 ENCOUNTER — LAB (OUTPATIENT)
Age: 69
End: 2024-10-14

## 2024-10-14 DIAGNOSIS — E78.2 MIXED HYPERLIPIDEMIA: ICD-10-CM

## 2024-10-14 DIAGNOSIS — I10 ESSENTIAL (PRIMARY) HYPERTENSION: ICD-10-CM

## 2024-10-14 DIAGNOSIS — E11.65 UNCONTROLLED TYPE 2 DIABETES MELLITUS WITH HYPERGLYCEMIA, WITHOUT LONG-TERM CURRENT USE OF INSULIN (HCC): ICD-10-CM

## 2024-10-15 LAB
ALBUMIN SERPL-MCNC: 4.2 G/DL (ref 3.5–5)
ALBUMIN/GLOB SERPL: 1.4 (ref 1.1–2.2)
ALP SERPL-CCNC: 65 U/L (ref 45–117)
ALT SERPL-CCNC: 22 U/L (ref 12–78)
ANION GAP SERPL CALC-SCNC: 7 MMOL/L (ref 2–12)
AST SERPL-CCNC: 17 U/L (ref 15–37)
BILIRUB SERPL-MCNC: 1 MG/DL (ref 0.2–1)
BUN SERPL-MCNC: 17 MG/DL (ref 6–20)
BUN/CREAT SERPL: 18 (ref 12–20)
CALCIUM SERPL-MCNC: 9.7 MG/DL (ref 8.5–10.1)
CHLORIDE SERPL-SCNC: 106 MMOL/L (ref 97–108)
CHOLEST SERPL-MCNC: 163 MG/DL
CO2 SERPL-SCNC: 25 MMOL/L (ref 21–32)
CREAT SERPL-MCNC: 0.94 MG/DL (ref 0.7–1.3)
CREAT UR-MCNC: 119 MG/DL
EST. AVERAGE GLUCOSE BLD GHB EST-MCNC: 134 MG/DL
GLOBULIN SER CALC-MCNC: 3.1 G/DL (ref 2–4)
GLUCOSE SERPL-MCNC: 109 MG/DL (ref 65–100)
HBA1C MFR BLD: 6.3 % (ref 4–5.6)
HDLC SERPL-MCNC: 49 MG/DL
HDLC SERPL: 3.3 (ref 0–5)
LDLC SERPL CALC-MCNC: 91.2 MG/DL (ref 0–100)
MICROALBUMIN UR-MCNC: 10.2 MG/DL
MICROALBUMIN/CREAT UR-RTO: 86 MG/G (ref 0–30)
POTASSIUM SERPL-SCNC: 3.8 MMOL/L (ref 3.5–5.1)
PROT SERPL-MCNC: 7.3 G/DL (ref 6.4–8.2)
SODIUM SERPL-SCNC: 138 MMOL/L (ref 136–145)
TRIGL SERPL-MCNC: 114 MG/DL
VLDLC SERPL CALC-MCNC: 22.8 MG/DL

## 2024-10-17 ENCOUNTER — OFFICE VISIT (OUTPATIENT)
Age: 69
End: 2024-10-17
Payer: MEDICARE

## 2024-10-17 VITALS
HEIGHT: 71 IN | HEART RATE: 61 BPM | BODY MASS INDEX: 26.71 KG/M2 | DIASTOLIC BLOOD PRESSURE: 61 MMHG | OXYGEN SATURATION: 99 % | SYSTOLIC BLOOD PRESSURE: 115 MMHG | WEIGHT: 190.8 LBS | TEMPERATURE: 98.1 F

## 2024-10-17 DIAGNOSIS — I10 ESSENTIAL (PRIMARY) HYPERTENSION: ICD-10-CM

## 2024-10-17 DIAGNOSIS — E11.65 UNCONTROLLED TYPE 2 DIABETES MELLITUS WITH HYPERGLYCEMIA, WITHOUT LONG-TERM CURRENT USE OF INSULIN (HCC): Primary | ICD-10-CM

## 2024-10-17 DIAGNOSIS — E78.2 MIXED HYPERLIPIDEMIA: ICD-10-CM

## 2024-10-17 PROCEDURE — 3017F COLORECTAL CA SCREEN DOC REV: CPT | Performed by: INTERNAL MEDICINE

## 2024-10-17 PROCEDURE — 2022F DILAT RTA XM EVC RTNOPTHY: CPT | Performed by: INTERNAL MEDICINE

## 2024-10-17 PROCEDURE — 3074F SYST BP LT 130 MM HG: CPT | Performed by: INTERNAL MEDICINE

## 2024-10-17 PROCEDURE — 3078F DIAST BP <80 MM HG: CPT | Performed by: INTERNAL MEDICINE

## 2024-10-17 PROCEDURE — G8427 DOCREV CUR MEDS BY ELIG CLIN: HCPCS | Performed by: INTERNAL MEDICINE

## 2024-10-17 PROCEDURE — G8419 CALC BMI OUT NRM PARAM NOF/U: HCPCS | Performed by: INTERNAL MEDICINE

## 2024-10-17 PROCEDURE — G8484 FLU IMMUNIZE NO ADMIN: HCPCS | Performed by: INTERNAL MEDICINE

## 2024-10-17 PROCEDURE — 1036F TOBACCO NON-USER: CPT | Performed by: INTERNAL MEDICINE

## 2024-10-17 PROCEDURE — 3044F HG A1C LEVEL LT 7.0%: CPT | Performed by: INTERNAL MEDICINE

## 2024-10-17 PROCEDURE — 99214 OFFICE O/P EST MOD 30 MIN: CPT | Performed by: INTERNAL MEDICINE

## 2024-10-17 PROCEDURE — 1123F ACP DISCUSS/DSCN MKR DOCD: CPT | Performed by: INTERNAL MEDICINE

## 2024-10-17 RX ORDER — METFORMIN HCL 500 MG
500 TABLET, EXTENDED RELEASE 24 HR ORAL DAILY
Qty: 90 TABLET | Refills: 3 | Status: SHIPPED | OUTPATIENT
Start: 2024-10-17

## 2024-10-17 NOTE — PROGRESS NOTES
[facility] DIABETES AND ENDOCRINOLOGY              Debbie Espinosa MD FACE         HISTORY OF PRESENT ILLNESS     Sonido Horner is a 68  y.o. male.   HPI   Yearly  F/u for DM 2 after last visit in  October 2023        Gained 1 lb   He is doing fine   No interim medical issues         October 2023    He gained 5 lbs   [place] out of woods         Old history     He got diagnosed with [place] fever   He got antibiotics by pcp   Gained 1 lb of weight   He had some issues with PA on invokana  And   Janumet xr   Compliant with meds, otherwise   C/o price on janumet xr   Happy man   Agrees to better compliance         Review of Systems   Constitutional: Negative.   Psychiatric/Behavioral: Negative for depression and memory loss. The patient does not have insomnia.       Physical Exam   Constitutional: He is oriented to person, place, and time. He appears well-developed and well-nourished.   Psychiatric: He has a normal mood and affect.   Dr. Bautista is his podiatrist         Labs      Lab Results   Component Value Date    LABA1C 6.3 (H) 10/14/2024    LABA1C 6.0 (H) 09/29/2023    LABA1C 6.2 (H) 09/15/2022       Lab Results   Component Value Date     10/14/2024    K 3.8 10/14/2024     10/14/2024    CO2 25 10/14/2024    BUN 17 10/14/2024    CREATININE 0.94 10/14/2024    GLUCOSE 109 (H) 10/14/2024    CALCIUM 9.7 10/14/2024    BILITOT 1.0 10/14/2024    ALKPHOS 65 10/14/2024    AST 17 10/14/2024    ALT 22 10/14/2024    LABGLOM 88 10/14/2024    GFRAA >60 09/07/2021    AGRATIO 2.0 09/15/2022    GLOB 3.1 10/14/2024    CHOL 163 10/14/2024    TRIG 114 10/14/2024    LDL 91.2 10/14/2024    HDL 49 10/14/2024       Lab Results   Component Value Date    MALBCR 86 (H) 10/14/2024           ASSESSMENT and PLAN     1. DM 2 uncontrolled-    a1c   Is  6.3 %     from October 2024    compared  to  6 %     from October 2023   compared   to     6.2 %     from  sept 2022   compared to   5.7 %     From sept 2021

## 2024-10-17 NOTE — PATIENT INSTRUCTIONS
SPECIFIC INSTRUCTIONS BELOW       Stay on metformin er 500 mg one pill a day     Stay on jardiance 25 mg a day before b-fast       -------------PAY ATTENTION TO THESE GENERAL INSTRUCTIONS -----------------      - The medications prescribed at this visit will not be available at pharmacy until 6 pm       - YOUR MED LIST IS NOT UP TO DATE AS SOME CHANGES ARE BEING MADE AFTER THE VISIT - FOLLOW SPECIFIC INSTRUCTIONS  ABOVE     -ANY tests other than blood work, which you opt to do  outside the  [facility] facilities, you are responsible for prior authorizations if  required    - HEALTH MAINTENANCE IS NOT GOING TO BE UP TO DATE ON YOUR AVS- PLEASE IGNORE     Results     *Normal results will not be notified by a phone call starting January 1 2021   *If you have an upcoming visit, the results will be discussed at the visit   *Please sign up for MY CHART if you want access to your lab and test results  *Abnormal results which require immediate attention will be notified by phone call   *Abnormal results which do not require immediate assistance will be notified in 1-2 weeks       Refills    -    have your pharmacy send us a refill request . Refills are done max for one year and a visit is a must before refills are extended    Follow up appointments -  highly encourage you to make it when you are checking out. We can accommodate you into the schedule based on your clinical situation, but not for extending refills beyond a year. Labs are important to give refills and is important to get labs before the visit     Phone calls  -  Allow  24 hrs. for non-urgent calls to be returned  Prior authorization - It may take 2-4 weeks to process  Forms  -  FMLA, DMV etc., will take up to 2 weeks to process  Cancellations - please notify the office 2 days in advance   Samples  - will only be dispensed at visits       If not showing for the appointments and cancelling appointments within 24 hours are kept track of and three  of

## 2024-10-17 NOTE — PROGRESS NOTES
Sonido Horner is a 68 y.o. male here for   Chief Complaint   Patient presents with    Diabetes       1. Have you been to the ER, urgent care clinic since your last visit?  Hospitalized since your last visit? -No    2. Have you seen or consulted any other health care providers outside of the [facility] since your last visit?  Include any pap smears or colon screening.-Dr. Looney  [facility]  [facility]

## 2025-02-15 DIAGNOSIS — E11.65 UNCONTROLLED TYPE 2 DIABETES MELLITUS WITH HYPERGLYCEMIA, WITHOUT LONG-TERM CURRENT USE OF INSULIN (HCC): ICD-10-CM

## 2025-02-17 RX ORDER — EMPAGLIFLOZIN 25 MG/1
25 TABLET, FILM COATED ORAL
Qty: 90 TABLET | Refills: 3 | Status: SHIPPED | OUTPATIENT
Start: 2025-02-17

## 2025-09-01 DIAGNOSIS — E78.2 MIXED HYPERLIPIDEMIA: ICD-10-CM

## 2025-09-02 RX ORDER — PRAVASTATIN SODIUM 20 MG
20 TABLET ORAL NIGHTLY
Qty: 90 TABLET | Refills: 3 | Status: SHIPPED | OUTPATIENT
Start: 2025-09-02